# Patient Record
Sex: MALE | Race: BLACK OR AFRICAN AMERICAN | Employment: FULL TIME
[De-identification: names, ages, dates, MRNs, and addresses within clinical notes are randomized per-mention and may not be internally consistent; named-entity substitution may affect disease eponyms.]

---

## 2017-01-09 ENCOUNTER — OFFICE VISIT (OUTPATIENT)
Dept: FAMILY MEDICINE CLINIC | Facility: CLINIC | Age: 62
End: 2017-01-09

## 2017-01-09 VITALS
HEART RATE: 87 BPM | WEIGHT: 207 LBS | HEIGHT: 75 IN | DIASTOLIC BLOOD PRESSURE: 70 MMHG | BODY MASS INDEX: 25.74 KG/M2 | SYSTOLIC BLOOD PRESSURE: 90 MMHG

## 2017-01-09 DIAGNOSIS — M47.817 SPONDYLOSIS OF LUMBOSACRAL REGION WITHOUT MYELOPATHY OR RADICULOPATHY: ICD-10-CM

## 2017-01-09 DIAGNOSIS — N52.9 ERECTILE DYSFUNCTION, UNSPECIFIED ERECTILE DYSFUNCTION TYPE: ICD-10-CM

## 2017-01-09 DIAGNOSIS — E78.5 DYSLIPIDEMIA: ICD-10-CM

## 2017-01-09 DIAGNOSIS — M19.011 PRIMARY OSTEOARTHRITIS OF BOTH SHOULDERS: ICD-10-CM

## 2017-01-09 DIAGNOSIS — M19.012 PRIMARY OSTEOARTHRITIS OF BOTH SHOULDERS: ICD-10-CM

## 2017-01-09 DIAGNOSIS — M17.0 PRIMARY OSTEOARTHRITIS OF BOTH KNEES: ICD-10-CM

## 2017-01-09 DIAGNOSIS — M75.122 COMPLETE TEAR OF LEFT ROTATOR CUFF: ICD-10-CM

## 2017-01-09 DIAGNOSIS — Z12.5 SPECIAL SCREENING FOR MALIGNANT NEOPLASM OF PROSTATE: ICD-10-CM

## 2017-01-09 DIAGNOSIS — I89.0 LYMPHEDEMA OF LEFT LEG: ICD-10-CM

## 2017-01-09 DIAGNOSIS — S83.8X1A SPRAIN OF OTHER LIGAMENT OF RIGHT KNEE, INITIAL ENCOUNTER: Primary | ICD-10-CM

## 2017-01-09 PROCEDURE — 99213 OFFICE O/P EST LOW 20 MIN: CPT | Performed by: FAMILY MEDICINE

## 2017-01-09 RX ORDER — SILDENAFIL 100 MG/1
100 TABLET, FILM COATED ORAL PRN
Qty: 36 TABLET | Refills: 2 | Status: SHIPPED | OUTPATIENT
Start: 2017-01-09 | End: 2018-05-11 | Stop reason: SDUPTHER

## 2017-01-09 RX ORDER — IBUPROFEN 800 MG/1
800 TABLET ORAL EVERY 8 HOURS PRN
Qty: 270 TABLET | Refills: 3 | Status: SHIPPED | OUTPATIENT
Start: 2017-01-09 | End: 2018-06-14 | Stop reason: SDUPTHER

## 2017-01-09 ASSESSMENT — PATIENT HEALTH QUESTIONNAIRE - PHQ9
1. LITTLE INTEREST OR PLEASURE IN DOING THINGS: 0
2. FEELING DOWN, DEPRESSED OR HOPELESS: 0
SUM OF ALL RESPONSES TO PHQ9 QUESTIONS 1 & 2: 0
SUM OF ALL RESPONSES TO PHQ QUESTIONS 1-9: 0

## 2017-01-12 ENCOUNTER — OFFICE VISIT (OUTPATIENT)
Dept: ORTHOPEDIC SURGERY | Facility: CLINIC | Age: 62
End: 2017-01-12

## 2017-01-12 VITALS — BODY MASS INDEX: 25.49 KG/M2 | HEIGHT: 75 IN | WEIGHT: 205 LBS

## 2017-01-12 DIAGNOSIS — M25.361 PATELLAR INSTABILITY OF RIGHT KNEE: Primary | ICD-10-CM

## 2017-01-12 PROCEDURE — 99213 OFFICE O/P EST LOW 20 MIN: CPT | Performed by: ORTHOPAEDIC SURGERY

## 2017-01-14 ASSESSMENT — ENCOUNTER SYMPTOMS
NAUSEA: 0
TROUBLE SWALLOWING: 0
RHINORRHEA: 0
CONSTIPATION: 0
SHORTNESS OF BREATH: 0
BACK PAIN: 1
ABDOMINAL PAIN: 0
COUGH: 0
CHEST TIGHTNESS: 0
DIARRHEA: 0
SORE THROAT: 0

## 2017-01-17 ENCOUNTER — OFFICE VISIT (OUTPATIENT)
Dept: ORTHOPEDIC SURGERY | Facility: CLINIC | Age: 62
End: 2017-01-17

## 2017-01-17 VITALS — BODY MASS INDEX: 25.49 KG/M2 | WEIGHT: 205.03 LBS | HEIGHT: 75 IN

## 2017-01-17 DIAGNOSIS — M25.361 PATELLAR INSTABILITY OF RIGHT KNEE: Primary | ICD-10-CM

## 2017-01-17 DIAGNOSIS — M17.0 PRIMARY OSTEOARTHRITIS OF BOTH KNEES: ICD-10-CM

## 2017-01-17 PROCEDURE — 99212 OFFICE O/P EST SF 10 MIN: CPT | Performed by: ORTHOPAEDIC SURGERY

## 2017-02-21 RX ORDER — SILDENAFIL CITRATE 100 MG
TABLET ORAL
Qty: 36 TABLET | Refills: 0 | Status: SHIPPED | OUTPATIENT
Start: 2017-02-21 | End: 2017-05-23 | Stop reason: SDUPTHER

## 2017-03-10 ENCOUNTER — TELEPHONE (OUTPATIENT)
Dept: ORTHOPEDIC SURGERY | Facility: CLINIC | Age: 62
End: 2017-03-10

## 2017-03-13 DIAGNOSIS — M17.0 PRIMARY OSTEOARTHRITIS OF BOTH KNEES: Primary | ICD-10-CM

## 2017-05-23 RX ORDER — SILDENAFIL CITRATE 100 MG
TABLET ORAL
Qty: 36 TABLET | Refills: 3 | Status: SHIPPED | OUTPATIENT
Start: 2017-05-23

## 2018-05-11 ENCOUNTER — OFFICE VISIT (OUTPATIENT)
Dept: FAMILY MEDICINE CLINIC | Age: 63
End: 2018-05-11
Payer: COMMERCIAL

## 2018-05-11 VITALS
HEIGHT: 75 IN | BODY MASS INDEX: 27.21 KG/M2 | DIASTOLIC BLOOD PRESSURE: 89 MMHG | SYSTOLIC BLOOD PRESSURE: 125 MMHG | WEIGHT: 218.8 LBS | HEART RATE: 85 BPM

## 2018-05-11 DIAGNOSIS — S40.861A INSECT BITE OF ARM, RIGHT, INITIAL ENCOUNTER: Primary | ICD-10-CM
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
## 2018-05-11 DIAGNOSIS — Z23 NEED FOR 23-POLYVALENT PNEUMOCOCCAL POLYSACCHARIDE VACCINE: ICD-10-CM

## 2018-05-11 PROCEDURE — 90471 IMMUNIZATION ADMIN: CPT | Performed by: NURSE PRACTITIONER

## 2018-05-11 PROCEDURE — 99213 OFFICE O/P EST LOW 20 MIN: CPT | Performed by: NURSE PRACTITIONER

## 2018-05-11 PROCEDURE — 90732 PPSV23 VACC 2 YRS+ SUBQ/IM: CPT | Performed by: NURSE PRACTITIONER

## 2018-05-11 RX ORDER — CETIRIZINE HYDROCHLORIDE 10 MG/1
10 TABLET ORAL DAILY
Qty: 14 TABLET | Refills: 0 | Status: SHIPPED | OUTPATIENT
Start: 2018-05-11 | End: 2020-06-01 | Stop reason: ALTCHOICE

## 2018-05-11 ASSESSMENT — ENCOUNTER SYMPTOMS: COLOR CHANGE: 0

## 2018-05-11 ASSESSMENT — PATIENT HEALTH QUESTIONNAIRE - PHQ9
SUM OF ALL RESPONSES TO PHQ QUESTIONS 1-9: 0
1. LITTLE INTEREST OR PLEASURE IN DOING THINGS: 0
SUM OF ALL RESPONSES TO PHQ9 QUESTIONS 1 & 2: 0

## 2018-05-15 ENCOUNTER — TELEPHONE (OUTPATIENT)
Dept: FAMILY MEDICINE CLINIC | Age: 63
End: 2018-05-15

## 2018-06-14 ENCOUNTER — OFFICE VISIT (OUTPATIENT)
Dept: FAMILY MEDICINE CLINIC | Age: 63
End: 2018-06-14
Payer: COMMERCIAL

## 2018-06-14 VITALS
RESPIRATION RATE: 20 BRPM | SYSTOLIC BLOOD PRESSURE: 110 MMHG | HEIGHT: 75 IN | WEIGHT: 214 LBS | TEMPERATURE: 97.5 F | HEART RATE: 72 BPM | DIASTOLIC BLOOD PRESSURE: 76 MMHG | OXYGEN SATURATION: 97 % | BODY MASS INDEX: 26.61 KG/M2

## 2018-06-14 DIAGNOSIS — M19.012 PRIMARY OSTEOARTHRITIS OF BOTH SHOULDERS: ICD-10-CM

## 2018-06-14 DIAGNOSIS — M17.0 PRIMARY OSTEOARTHRITIS OF BOTH KNEES: ICD-10-CM

## 2018-06-14 DIAGNOSIS — M19.011 PRIMARY OSTEOARTHRITIS OF BOTH SHOULDERS: ICD-10-CM
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
## 2018-06-14 DIAGNOSIS — S40.861D: Primary | ICD-10-CM

## 2018-06-14 PROCEDURE — 99213 OFFICE O/P EST LOW 20 MIN: CPT | Performed by: PHYSICIAN ASSISTANT

## 2018-06-14 RX ORDER — IBUPROFEN 800 MG/1
800 TABLET ORAL EVERY 8 HOURS PRN
Qty: 270 TABLET | Refills: 1 | Status: SHIPPED | OUTPATIENT
Start: 2018-06-14 | End: 2020-09-26 | Stop reason: SDUPTHER

## 2018-06-14 ASSESSMENT — ENCOUNTER SYMPTOMS
RHINORRHEA: 0
DIARRHEA: 0
SHORTNESS OF BREATH: 0
VOMITING: 0

## 2018-06-14 ASSESSMENT — PATIENT HEALTH QUESTIONNAIRE - PHQ9
2. FEELING DOWN, DEPRESSED OR HOPELESS: 0
SUM OF ALL RESPONSES TO PHQ9 QUESTIONS 1 & 2: 0
SUM OF ALL RESPONSES TO PHQ QUESTIONS 1-9: 0
1. LITTLE INTEREST OR PLEASURE IN DOING THINGS: 0

## 2018-06-16 ASSESSMENT — ENCOUNTER SYMPTOMS
COUGH: 0
NAUSEA: 0
CONSTIPATION: 0
BACK PAIN: 0

## 2018-07-10 ENCOUNTER — OFFICE VISIT (OUTPATIENT)
Dept: FAMILY MEDICINE CLINIC | Age: 63
End: 2018-07-10
Payer: COMMERCIAL

## 2018-07-10 VITALS
RESPIRATION RATE: 20 BRPM | HEIGHT: 75 IN | HEART RATE: 64 BPM | TEMPERATURE: 97 F | SYSTOLIC BLOOD PRESSURE: 116 MMHG | OXYGEN SATURATION: 99 % | WEIGHT: 211.6 LBS | DIASTOLIC BLOOD PRESSURE: 72 MMHG | BODY MASS INDEX: 26.31 KG/M2

## 2018-07-10 DIAGNOSIS — M19.011 PRIMARY OSTEOARTHRITIS OF BOTH SHOULDERS: Primary | ICD-10-CM

## 2018-07-10 DIAGNOSIS — E78.5 DYSLIPIDEMIA: ICD-10-CM

## 2018-07-10 DIAGNOSIS — Z12.5 SPECIAL SCREENING FOR MALIGNANT NEOPLASM OF PROSTATE: ICD-10-CM

## 2018-07-10 DIAGNOSIS — M19.012 PRIMARY OSTEOARTHRITIS OF BOTH SHOULDERS: Primary | ICD-10-CM

## 2018-07-10 PROCEDURE — 99213 OFFICE O/P EST LOW 20 MIN: CPT | Performed by: PHYSICIAN ASSISTANT

## 2018-07-10 NOTE — PROGRESS NOTES
Visit Information    Have you changed or started any medications since your last visit including any over-the-counter medicines, vitamins, or herbal medicines? no   Have you stopped taking any of your medications? Is so, why? -  no  Are you having any side effects from any of your medications? - no    Have you seen any other physician or provider since your last visit?  no   Have you had any other diagnostic tests since your last visit?  no   Have you been seen in the emergency room and/or had an admission in a hospital since we last saw you?  no   Have you had your routine dental cleaning in the past 6 months?  no     Do you have an active MyChart account? If no, what is the barrier?   Yes    Patient Care Team:  Michael Neri MD as PCP - General (Family Medicine)  Elliot Dhillon PA-C as PCP - S Attributed Provider    Medical History Review  Past Medical, Family, and Social History reviewed and does not contribute to the patient presenting condition    Health Maintenance   Topic Date Due    Hepatitis C screen  12/01/2018 (Originally 1955)    HIV screen  12/01/2018 (Originally 5/14/1970)    DTaP/Tdap/Td vaccine (1 - Tdap) 05/11/2019 (Originally 5/14/1974)    Shingles Vaccine (1 of 2 - 2 Dose Series) 05/11/2019 (Originally 5/14/2005)    Flu vaccine (1) 09/01/2018    Colon cancer screen colonoscopy  06/01/2020    Lipid screen  01/16/2022    Pneumococcal med risk  Completed

## 2018-07-10 NOTE — PROGRESS NOTES
[address]  [facility] [address]  Dept: [phone]  Dept Fax: [phone]    Office Progress/Follow Up Note  Date of patient's visit: 7/10/2018  Patient's Name:  Eryn Brar YOB: 1955            Patient Care Team:  Alycia Zhao MD as PCP - General (Family Medicine)  Miranda Kolb PA-C as PCP - S Attributed Provider  ================================================================    REASON FOR VISIT/CHIEF COMPLAINT:  Arm Pain (Rt arm discomfort possibly related to a recent insect bite. pt states it feels like something is crawling under his skin )    HISTORY OF PRESENTING ILLNESS:  History was obtained from: patient. Eryn Brar is a 61 y.o. is here planing of decreased range of motion in right shoulder. Patient states steroid cream prescribed has improved skin disorder. Discuss with patient origin of right shoulder pain and decreased range of motion. Patient was provided stretches and referral to the PT. Patient is followed by Ortho, instructed patient to call and schedule appointment to have right shoulder evaluated. Instructed patient to inform Ortho of PT referral and get okay to go to PT before Ortho appointment, patient voices understanding. Health maintenance review, lab work order, instructed patient to have lab work completed before follow-up appointment, patient voices understanding. Shoulder Pain     The pain is present in the right shoulder. This is a new problem. The current episode started 1 to 4 weeks ago. The pain is at a severity of 0/10. The patient is experiencing no pain. Associated symptoms include joint locking and a limited range of motion. Pertinent negatives include no fever, joint swelling, numbness, stiffness or tingling. He has tried NSAIDS and heat for the symptoms. The treatment provided moderate relief. His past medical history is significant for osteoarthritis.        Patient Active Problem List   Diagnosis    Dyslipidemia    Hammer toe    Unstable gait    Tobacco abuse    Lumbar back sprain    Osteoarthritis of lumbosacral spine    Primary osteoarthritis of both knees    Primary osteoarthritis of both shoulders    Special screening for malignant neoplasm of prostate    Erectile dysfunction    S/P rotator cuff repair       There are no preventive care reminders to display for this patient. Allergies   Allergen Reactions    Percocet [Oxycodone-Acetaminophen] Other (See Comments)     Insomnia and anxiety    Shrimp Flavor Nausea And Vomiting     all sea food causes Nausea & vomiting           Current Outpatient Prescriptions   Medication Sig Dispense Refill    ibuprofen (ADVIL;MOTRIN) 800 MG tablet Take 1 tablet by mouth every 8 hours as needed for Pain 270 tablet 1    hydrocortisone 2.5 % cream Apply topically 2 times daily. 20 g 1    cetirizine (ZYRTEC ALLERGY) 10 MG tablet Take 1 tablet by mouth daily 14 tablet 0    VIAGRA 100 MG tablet TAKE 1 TABLET AS NEEDED 36 tablet 3    Handicap Placard MISC by Does not apply route Please provide with handicap placard for 5 years 1 each 0     No current facility-administered medications for this visit. Social History   Substance Use Topics    Smoking status: Current Every Day Smoker     Packs/day: 0.10     Years: 25.00     Types: Cigarettes     Start date: 12/28/1965    Smokeless tobacco: Never Used      Comment: 1-2 cigarettes a day     Alcohol use 0.0 oz/week       Family History   Problem Relation Age of Onset    Alzheimer's Disease Mother         REVIEW OF SYSTEMS:  Review of Systems   Constitutional: Negative for chills and fever. Respiratory: Negative for cough and shortness of breath. Cardiovascular: Negative for chest pain. Gastrointestinal: Negative for constipation, diarrhea, nausea and vomiting. Genitourinary: Negative for dysuria and frequency. Musculoskeletal: Negative for stiffness.         See HPI   Neurological: Negative for tingling, numbness and headaches. PHYSICAL EXAM:  Vitals:    07/10/18 0948   BP: 116/72   Site: Right Arm   Position: Sitting   Cuff Size: Medium Adult   Pulse: 64   Resp: 20   Temp: 97 °F (36.1 °C)   TempSrc: Oral   SpO2: 99%   Weight: 211 lb 9.6 oz (96 kg)   Height: 6' 3.2\" (1.91 m)     BP Readings from Last 3 Encounters:   07/10/18 116/72   06/14/18 110/76   05/11/18 125/89        Physical Exam   Constitutional: He is oriented to person, place, and time and well-developed, well-nourished, and in no distress. HENT:   Head: Normocephalic and atraumatic. Eyes: Pupils are equal, round, and reactive to light. Cardiovascular: Normal rate, regular rhythm and normal heart sounds. Pulmonary/Chest: Effort normal and breath sounds normal.   Abdominal: Soft. He exhibits no mass. There is no tenderness. Musculoskeletal: He exhibits no edema. Right shoulder: He exhibits decreased range of motion and crepitus. He exhibits normal strength. Neurological: He is alert and oriented to person, place, and time. Gait normal.   Skin: Skin is warm and dry. Vitals reviewed. DIAGNOSTIC FINDINGS:  CBC:  Lab Results   Component Value Date    WBC 6.1 01/16/2017    HGB 12.7 01/16/2017     01/16/2017       BMP:    Lab Results   Component Value Date     01/16/2017    K 4.2 01/16/2017     01/16/2017    CO2 21 01/16/2017    BUN 24 01/16/2017    CREATININE 0.95 01/16/2017    GLUCOSE 98 01/16/2017       HEMOGLOBIN A1C: No results found for: LABA1C    FASTING LIPID PANEL:  Lab Results   Component Value Date    CHOL 170 01/16/2017    HDL 68 01/16/2017    TRIG 82 01/16/2017       ASSESSMENT AND PLAN:  Yvette Mccormick was seen today for arm pain.     Diagnoses and all orders for this visit:    Primary osteoarthritis of both shoulders  -     External Referral To Physical Therapy    Special screening for malignant neoplasm of prostate  -     Psa screening; Future    Dyslipidemia  -     Comprehensive Metabolic Panel; Future  -     CBC Auto Differential; Future  -     Lipid Panel; Future      FOLLOW UP AND INSTRUCTIONS:  Return in about 5 months (around 12/10/2018) for Shoulder Pain, Lab Review. · Discussed use, benefit, and side effects of prescribed medications. Barriers to medication compliance addressed. All patient questions answered. Pt voiced understanding. · Patient given educational materials - see patient instructions    Electronically signed by Sudhir Redman PA-C on 7/10/18 at 10:03 AM    This note is created with the assistance of a speech-recognition program. While intending to generate a document that actually reflects the content of the visit, the document can still have some mistakes which may not have been identified and corrected by editing.

## 2018-07-10 NOTE — PATIENT INSTRUCTIONS
degrees at the most (shoulder level is 90 degrees). 2. Hold the position for 3 to 5 seconds. Then lower your arm back to your side. If you need to, bring your \"good\" arm across your body and place it under the elbow as you lower your injured arm. Use your good arm to keep your injured arm from dropping down too fast.  3. Repeat 8 to 12 times. 4. When you first start out, don't hold any extra weight in your hand. As you get stronger, you may use a 1-pound to 2-pound dumbbell or a small can of food. Shoulder flexor and extensor exercise    These are isometric exercises. That means you contract your muscles without actually moving. 1. Push forward (flex): Stand facing a wall or doorjamb, about 6 inches or less back. Hold your injured arm against your body. Make a closed fist with your thumb on top. Then gently push your hand forward into the wall with about 25% to 50% of your strength. Don't let your body move backward as you push. Hold for about 6 seconds. Relax for a few seconds. Repeat 8 to 12 times. 2. Push backward (extend): Stand with your back flat against a wall. Your upper arm should be against the wall, with your elbow bent 90 degrees (your hand straight ahead). Push your elbow gently back against the wall with about 25% to 50% of your strength. Don't let your body move forward as you push. Hold for about 6 seconds. Relax for a few seconds. Repeat 8 to 12 times. Scapular exercise: Wall push-ups    This exercise is best done with your fingers somewhat turned out, rather than straight up and down. 1. Stand facing a wall, about 12 inches to 18 inches away. 2. Place your hands on the wall at shoulder height. 3. Slowly bend your elbows and bring your face to the wall. Keep your back and hips straight. 4. Push back to where you started. 5. Repeat 8 to 12 times. 6. When you can do this exercise against a wall comfortably, you can try it against a counter.  You can then slowly progress to the end of a couch, then to a sturdy chair, and finally to the floor. Scapular exercise: Retraction    For this exercise, you will need elastic exercise material, such as surgical tubing or Thera-Band. 1. Put the band around a solid object at about waist level. (A bedpost will work well.) Each hand should hold an end of the band. 2. With your elbows at your sides and bent to 90 degrees, pull the band back. Your shoulder blades should move toward each other. Then move your arms back where you started. 3. Repeat 8 to 12 times. 4. If you have good range of motion in your shoulders, try this exercise with your arms lifted out to the sides. Keep your elbows at a 90-degree angle. Raise the elastic band up to about shoulder level. Pull the band back to move your shoulder blades toward each other. Then move your arms back where you started. Internal rotator strengthening exercise    1. Start by tying a piece of elastic exercise material to a doorknob. You can use surgical tubing or Thera-Band. 2. Stand or sit with your shoulder relaxed and your elbow bent 90 degrees. Your upper arm should rest comfortably against your side. Squeeze a rolled towel between your elbow and your body for comfort. This will help keep your arm at your side. 3. Hold one end of the elastic band in the hand of the painful arm. 4. Slowly rotate your forearm toward your body until it touches your belly. Slowly move it back to where you started. 5. Keep your elbow and upper arm firmly tucked against the towel roll or at your side. 6. Repeat 8 to 12 times. External rotator strengthening exercise    1. Start by tying a piece of elastic exercise material to a doorknob. You can use surgical tubing or Thera-Band. (You may also hold one end of the band in each hand.)  2. Stand or sit with your shoulder relaxed and your elbow bent 90 degrees. Your upper arm should rest comfortably against your side.  Squeeze a rolled towel between your elbow and your body for

## 2018-07-11 ASSESSMENT — ENCOUNTER SYMPTOMS
CONSTIPATION: 0
NAUSEA: 0
SHORTNESS OF BREATH: 0
COUGH: 0
VOMITING: 0
DIARRHEA: 0

## 2018-07-19 ENCOUNTER — OFFICE VISIT (OUTPATIENT)
Dept: ORTHOPEDIC SURGERY | Age: 63
End: 2018-07-19
Payer: COMMERCIAL

## 2018-07-19 VITALS — BODY MASS INDEX: 26.98 KG/M2 | HEIGHT: 75 IN | WEIGHT: 217 LBS

## 2018-07-19 DIAGNOSIS — M25.511 CHRONIC RIGHT SHOULDER PAIN: ICD-10-CM

## 2018-07-19 DIAGNOSIS — G89.29 CHRONIC RIGHT SHOULDER PAIN: ICD-10-CM

## 2018-07-19 DIAGNOSIS — M47.22 OSTEOARTHRITIS OF SPINE WITH RADICULOPATHY, CERVICAL REGION: ICD-10-CM

## 2018-07-19 DIAGNOSIS — G56.02 CARPAL TUNNEL SYNDROME OF LEFT WRIST: ICD-10-CM

## 2018-07-19 DIAGNOSIS — M75.101 TEAR OF RIGHT ROTATOR CUFF, UNSPECIFIED TEAR EXTENT: Primary | ICD-10-CM

## 2018-07-19 PROCEDURE — 99213 OFFICE O/P EST LOW 20 MIN: CPT | Performed by: ORTHOPAEDIC SURGERY

## 2018-07-19 PROCEDURE — 20526 THER INJECTION CARP TUNNEL: CPT | Performed by: ORTHOPAEDIC SURGERY

## 2018-07-19 RX ORDER — METHYLPREDNISOLONE ACETATE 40 MG/ML
40 INJECTION, SUSPENSION INTRA-ARTICULAR; INTRALESIONAL; INTRAMUSCULAR; SOFT TISSUE ONCE
Status: COMPLETED | OUTPATIENT
Start: 2018-07-19 | End: 2018-07-19

## 2018-07-19 RX ADMIN — METHYLPREDNISOLONE ACETATE 40 MG: 40 INJECTION, SUSPENSION INTRA-ARTICULAR; INTRALESIONAL; INTRAMUSCULAR; SOFT TISSUE at 09:01

## 2018-07-19 NOTE — PROGRESS NOTES
demonstrate full range of motion. He had no pain. Examination of the hand show minor thenar wasting bilaterally. Sensation appeared to be normal but [place] test bilaterally is positive. X-rays: I reviewed the x-rays of the shoulder which show that the patient has the unusual coracoclavicular joint. He also has some degenerative changes in the acromioclavicular joint and the humeral head appears to be riding thigh suggestive of a chronic rotator cuff arthropathy. Diagnosis: #1 chronic rotator cuff arthropathy right shoulder. #2 cervical spondylosis. #3 bilateral carpal tunnel syndrome left being worse than the right. Treatment: As for the carpal tunnel after thoroughly cleaning the skin with Betadine and alcohol I injected the left carpal tunnel with 40 mg of Depo-Medrol without any complications. The patient is going to have x-rays of the shoulder and cervical spine and an MRI of the right shoulder. I will see him again after those studies.

## 2018-08-02 ENCOUNTER — HOSPITAL ENCOUNTER (OUTPATIENT)
Dept: MRI IMAGING | Facility: CLINIC | Age: 63
Discharge: HOME | End: 2018-08-04
Payer: COMMERCIAL

## 2018-08-02 DIAGNOSIS — M25.511 CHRONIC RIGHT SHOULDER PAIN: ICD-10-CM

## 2018-08-02 DIAGNOSIS — G89.29 CHRONIC RIGHT SHOULDER PAIN: ICD-10-CM

## 2018-08-02 DIAGNOSIS — M75.101 TEAR OF RIGHT ROTATOR CUFF, UNSPECIFIED TEAR EXTENT: ICD-10-CM

## 2018-08-02 PROCEDURE — 73221 MRI JOINT UPR EXTREM W/O DYE: CPT

## 2018-08-07 ENCOUNTER — HOSPITAL ENCOUNTER (OUTPATIENT)
Dept: GENERAL RADIOLOGY | Age: 63
Discharge: HOME | End: 2018-08-09
Payer: COMMERCIAL

## 2018-08-07 ENCOUNTER — HOSPITAL ENCOUNTER (OUTPATIENT)
Age: 63
Discharge: HOME | End: 2018-08-07
Payer: COMMERCIAL

## 2018-08-07 ENCOUNTER — HOSPITAL ENCOUNTER (OUTPATIENT)
Age: 63
Discharge: HOME | End: 2018-08-09
Payer: COMMERCIAL

## 2018-08-07 DIAGNOSIS — Z12.5 SPECIAL SCREENING FOR MALIGNANT NEOPLASM OF PROSTATE: ICD-10-CM

## 2018-08-07 DIAGNOSIS — E78.5 DYSLIPIDEMIA: ICD-10-CM

## 2018-08-07 DIAGNOSIS — M75.101 TEAR OF RIGHT ROTATOR CUFF, UNSPECIFIED TEAR EXTENT: ICD-10-CM

## 2018-08-07 DIAGNOSIS — M47.22 OSTEOARTHRITIS OF SPINE WITH RADICULOPATHY, CERVICAL REGION: Primary | ICD-10-CM

## 2018-08-07 DIAGNOSIS — M47.22 OSTEOARTHRITIS OF SPINE WITH RADICULOPATHY, CERVICAL REGION: ICD-10-CM

## 2018-08-07 DIAGNOSIS — G89.29 CHRONIC RIGHT SHOULDER PAIN: ICD-10-CM

## 2018-08-07 DIAGNOSIS — M25.511 CHRONIC RIGHT SHOULDER PAIN: ICD-10-CM

## 2018-08-07 LAB
ABSOLUTE EOS #: 0.2 K/UL (ref 0–0.4)
ABSOLUTE IMMATURE GRANULOCYTE: ABNORMAL K/UL (ref 0–0.3)
ABSOLUTE LYMPH #: 1.8 K/UL (ref 1–4.8)
ABSOLUTE MONO #: 0.4 K/UL (ref 0.2–0.8)
ALBUMIN SERPL-MCNC: 4.4 G/DL (ref 3.5–5.2)
ALBUMIN/GLOBULIN RATIO: ABNORMAL (ref 1–2.5)
ALP BLD-CCNC: 66 U/L (ref 40–129)
ALT SERPL-CCNC: 26 U/L (ref 5–41)
ANION GAP SERPL CALCULATED.3IONS-SCNC: 10 MMOL/L (ref 9–17)
AST SERPL-CCNC: 25 U/L
BASOPHILS # BLD: 1 % (ref 0–2)
BASOPHILS ABSOLUTE: 0 K/UL (ref 0–0.2)
BILIRUB SERPL-MCNC: 0.38 MG/DL (ref 0.3–1.2)
BUN BLDV-MCNC: 17 MG/DL (ref 8–23)
BUN/CREAT BLD: 17 (ref 9–20)
CALCIUM SERPL-MCNC: 9 MG/DL (ref 8.6–10.4)
CHLORIDE BLD-SCNC: 108 MMOL/L (ref 98–107)
CHOLESTEROL/HDL RATIO: 2.9
CHOLESTEROL: 190 MG/DL
CO2: 24 MMOL/L (ref 20–31)
CREAT SERPL-MCNC: 1.03 MG/DL (ref 0.7–1.2)
DIFFERENTIAL TYPE: ABNORMAL
EOSINOPHILS RELATIVE PERCENT: 5 % (ref 1–4)
GFR AFRICAN AMERICAN: >60 ML/MIN
GFR NON-AFRICAN AMERICAN: >60 ML/MIN
GFR SERPL CREATININE-BSD FRML MDRD: ABNORMAL ML/MIN/{1.73_M2}
GFR SERPL CREATININE-BSD FRML MDRD: ABNORMAL ML/MIN/{1.73_M2}
GLUCOSE BLD-MCNC: 91 MG/DL (ref 70–99)
HCT VFR BLD CALC: 40.1 % (ref 41–53)
HDLC SERPL-MCNC: 65 MG/DL
HEMOGLOBIN: 13.3 G/DL (ref 13.5–17.5)
IMMATURE GRANULOCYTES: ABNORMAL %
LDL CHOLESTEROL: 106 MG/DL (ref 0–130)
LYMPHOCYTES # BLD: 39 % (ref 24–44)
MCH RBC QN AUTO: 32.6 PG (ref 26–34)
MCHC RBC AUTO-ENTMCNC: 33.1 G/DL (ref 31–37)
MCV RBC AUTO: 98.6 FL (ref 80–100)
MONOCYTES # BLD: 9 % (ref 1–7)
NRBC AUTOMATED: ABNORMAL PER 100 WBC
PDW BLD-RTO: 14.8 % (ref 11.5–14.5)
PLATELET # BLD: 257 K/UL (ref 130–400)
PLATELET ESTIMATE: ABNORMAL
PMV BLD AUTO: 7.6 FL (ref 6–12)
POTASSIUM SERPL-SCNC: 4.3 MMOL/L (ref 3.7–5.3)
PROSTATE SPECIFIC ANTIGEN: 1.19 UG/L
RBC # BLD: 4.06 M/UL (ref 4.5–5.9)
RBC # BLD: ABNORMAL 10*6/UL
SEG NEUTROPHILS: 46 % (ref 36–66)
SEGMENTED NEUTROPHILS ABSOLUTE COUNT: 2.2 K/UL (ref 1.8–7.7)
SODIUM BLD-SCNC: 142 MMOL/L (ref 135–144)
TOTAL PROTEIN: 6.8 G/DL (ref 6.4–8.3)
TRIGL SERPL-MCNC: 95 MG/DL
VLDLC SERPL CALC-MCNC: NORMAL MG/DL (ref 1–30)
WBC # BLD: 4.6 K/UL (ref 3.5–11)
WBC # BLD: ABNORMAL 10*3/UL

## 2018-08-07 PROCEDURE — 80061 LIPID PANEL: CPT

## 2018-08-07 PROCEDURE — 85025 COMPLETE CBC W/AUTO DIFF WBC: CPT

## 2018-08-07 PROCEDURE — G0103 PSA SCREENING: HCPCS

## 2018-08-07 PROCEDURE — 72040 X-RAY EXAM NECK SPINE 2-3 VW: CPT

## 2018-08-07 PROCEDURE — 36415 COLL VENOUS BLD VENIPUNCTURE: CPT

## 2018-08-07 PROCEDURE — 80053 COMPREHEN METABOLIC PANEL: CPT

## 2018-08-07 PROCEDURE — 73030 X-RAY EXAM OF SHOULDER: CPT

## 2018-08-16 ENCOUNTER — OFFICE VISIT (OUTPATIENT)
Dept: FAMILY MEDICINE CLINIC | Age: 63
End: 2018-08-16
Payer: COMMERCIAL

## 2018-08-16 ENCOUNTER — OFFICE VISIT (OUTPATIENT)
Dept: ORTHOPEDIC SURGERY | Age: 63
End: 2018-08-16
Payer: COMMERCIAL

## 2018-08-16 VITALS
WEIGHT: 206.4 LBS | SYSTOLIC BLOOD PRESSURE: 117 MMHG | RESPIRATION RATE: 16 BRPM | DIASTOLIC BLOOD PRESSURE: 76 MMHG | HEIGHT: 75 IN | OXYGEN SATURATION: 96 % | HEART RATE: 74 BPM | BODY MASS INDEX: 25.66 KG/M2

## 2018-08-16 VITALS — HEIGHT: 75 IN | BODY MASS INDEX: 26.98 KG/M2 | WEIGHT: 217 LBS

## 2018-08-16 DIAGNOSIS — Z71.6 TOBACCO ABUSE COUNSELING: ICD-10-CM

## 2018-08-16 DIAGNOSIS — M47.812 SPONDYLOSIS OF CERVICAL REGION WITHOUT MYELOPATHY OR RADICULOPATHY: Primary | ICD-10-CM

## 2018-08-16 DIAGNOSIS — Z00.00 ANNUAL PHYSICAL EXAM: Primary | ICD-10-CM

## 2018-08-16 DIAGNOSIS — M19.012 PRIMARY OSTEOARTHRITIS OF BOTH SHOULDERS: ICD-10-CM

## 2018-08-16 DIAGNOSIS — M19.011 PRIMARY OSTEOARTHRITIS OF BOTH SHOULDERS: ICD-10-CM

## 2018-08-16 DIAGNOSIS — M75.111 INCOMPLETE TEAR OF RIGHT ROTATOR CUFF: ICD-10-CM

## 2018-08-16 PROCEDURE — 99396 PREV VISIT EST AGE 40-64: CPT | Performed by: PHYSICIAN ASSISTANT

## 2018-08-16 PROCEDURE — 99213 OFFICE O/P EST LOW 20 MIN: CPT | Performed by: ORTHOPAEDIC SURGERY

## 2018-08-16 NOTE — PROGRESS NOTES
This patient is still having some pain in the shoulder. However she thinks that it is going to get better because he is going to go back to his original job. Graft the patient's pain complaint of pain today was in the back of the neck and the suprascapular area on the right side. Examination of the neck shows limitation of motion weeks pain on rotation. He has no radicular symptoms or signed. His shoulder examination shows today excellent painless motion. X-rays: He had an MRI which shows a minor tear in the rotator cuff. X-rays of the cervical spine show significant degenerative changes with the flattening of the lordosis. Juanita Hollow His x-rays of the shoulder taken in 2013 and the one taken this time and they show no change in the position of the humeral head and the glenoid fossa. The patient's x-ray of the just either in the past and the shoulder x-ray done today show that the patient does have coracoclavicular joint. 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 Diagnosis: Cervical spondylosis. #2 minor rotator cuff tear right shoulder. #3 acromioclavicular joint arthritis. #4.  Coracoclavicular joints. Treatment: Present time his symptoms are related only to the neck and I therefore advised him to just continue to mobilize the neck and we will see him again as necessary. He does not need any surgery for his rotator cuff tear because he is functioning well.

## 2018-08-16 NOTE — PROGRESS NOTES
(ADVIL;MOTRIN) 800 MG tablet Take 1 tablet by mouth every 8 hours as needed for Pain 270 tablet 1    Handicap Placard MISC by Does not apply route Please provide with handicap placard for 5 years 1 each 0    hydrocortisone 2.5 % cream Apply topically 2 times daily. 20 g 1    cetirizine (ZYRTEC ALLERGY) 10 MG tablet Take 1 tablet by mouth daily 14 tablet 0    VIAGRA 100 MG tablet TAKE 1 TABLET AS NEEDED 36 tablet 3     No current facility-administered medications for this visit. Social History   Substance Use Topics    Smoking status: Current Every Day Smoker     Packs/day: 0.10     Years: 25.00     Types: Cigarettes     Start date: 12/28/1965    Smokeless tobacco: Never Used      Comment: 1-2 cigarettes a day     Alcohol use 0.0 oz/week       Family History   Problem Relation Age of Onset    Alzheimer's Disease Mother         REVIEW OF SYSTEMS:  Review of Systems   Constitutional: Negative for chills and fever. HENT: Negative for congestion, hearing loss and sore throat. Eyes: Negative for blurred vision and double vision. Respiratory: Negative for cough, sputum production and shortness of breath. Cardiovascular: Negative for chest pain, palpitations and leg swelling. Gastrointestinal: Negative for abdominal pain, constipation, diarrhea, nausea and vomiting. Genitourinary: Negative for dysuria, frequency and urgency. Musculoskeletal: Positive for joint pain and myalgias. Negative for back pain and neck pain. Skin: Positive for itching and rash. Neurological: Negative for dizziness, sensory change and headaches.        PHYSICAL EXAM:  Vitals:    08/16/18 1354   BP: 117/76   Site: Left Arm   Position: Sitting   Cuff Size: Medium Adult   Pulse: 74   Resp: 16   SpO2: 96%   Weight: 206 lb 6.4 oz (93.6 kg)   Height: 6' 3\" (1.905 m)     BP Readings from Last 3 Encounters:   08/16/18 117/76   07/10/18 116/72   06/14/18 110/76        Physical Exam   Constitutional: He is oriented to person, Justina Lomax PA-C on 8/16/18 at 2:00 PM    This note is created with the assistance of a speech-recognition program. While intending to generate a document that actually reflects the content of the visit, the document can still have some mistakes which may not have been identified and corrected by editing.

## 2018-08-18 ASSESSMENT — ENCOUNTER SYMPTOMS
VOMITING: 0
BLURRED VISION: 0
CONSTIPATION: 0
DIARRHEA: 0
DOUBLE VISION: 0
BACK PAIN: 0
SHORTNESS OF BREATH: 0
SORE THROAT: 0
SPUTUM PRODUCTION: 0
COUGH: 0
ABDOMINAL PAIN: 0
NAUSEA: 0

## 2018-08-24 ENCOUNTER — TELEPHONE (OUTPATIENT)
Dept: ORTHOPEDIC SURGERY | Age: 63
End: 2018-08-24

## 2018-08-24 DIAGNOSIS — M47.812 SPONDYLOSIS OF CERVICAL REGION WITHOUT MYELOPATHY OR RADICULOPATHY: Primary | ICD-10-CM

## 2018-09-06 ENCOUNTER — TELEPHONE (OUTPATIENT)
Dept: ORTHOPEDIC SURGERY | Age: 63
End: 2018-09-06

## 2019-01-03 ENCOUNTER — OFFICE VISIT (OUTPATIENT)
Dept: ORTHOPEDIC SURGERY | Age: 64
End: 2019-01-03
Payer: COMMERCIAL

## 2019-01-03 ENCOUNTER — HOSPITAL ENCOUNTER (OUTPATIENT)
Age: 64
Discharge: HOME | End: 2019-01-05
Payer: COMMERCIAL

## 2019-01-03 ENCOUNTER — HOSPITAL ENCOUNTER (OUTPATIENT)
Dept: GENERAL RADIOLOGY | Age: 64
Discharge: HOME | End: 2019-01-05
Payer: COMMERCIAL

## 2019-01-03 VITALS — BODY MASS INDEX: 25.66 KG/M2 | WEIGHT: 206.35 LBS | HEIGHT: 75 IN

## 2019-01-03 DIAGNOSIS — M25.532 CHRONIC PAIN OF LEFT WRIST: Primary | ICD-10-CM

## 2019-01-03 DIAGNOSIS — M25.532 CHRONIC PAIN OF LEFT WRIST: ICD-10-CM

## 2019-01-03 DIAGNOSIS — G89.29 CHRONIC PAIN OF LEFT WRIST: Primary | ICD-10-CM

## 2019-01-03 DIAGNOSIS — G89.29 CHRONIC PAIN OF LEFT WRIST: ICD-10-CM

## 2019-01-03 DIAGNOSIS — M19.132 POST-TRAUMATIC OSTEOARTHRITIS OF LEFT WRIST: ICD-10-CM

## 2019-01-03 PROCEDURE — 99213 OFFICE O/P EST LOW 20 MIN: CPT | Performed by: ORTHOPAEDIC SURGERY

## 2019-01-03 PROCEDURE — 73110 X-RAY EXAM OF WRIST: CPT

## 2019-01-03 PROCEDURE — 20605 DRAIN/INJ JOINT/BURSA W/O US: CPT | Performed by: ORTHOPAEDIC SURGERY

## 2019-01-03 RX ORDER — METHYLPREDNISOLONE ACETATE 40 MG/ML
40 INJECTION, SUSPENSION INTRA-ARTICULAR; INTRALESIONAL; INTRAMUSCULAR; SOFT TISSUE ONCE
Status: COMPLETED | OUTPATIENT
Start: 2019-01-03 | End: 2019-01-03

## 2019-01-03 RX ADMIN — METHYLPREDNISOLONE ACETATE 40 MG: 40 INJECTION, SUSPENSION INTRA-ARTICULAR; INTRALESIONAL; INTRAMUSCULAR; SOFT TISSUE at 15:19

## 2019-02-07 ENCOUNTER — OFFICE VISIT (OUTPATIENT)
Dept: ORTHOPEDIC SURGERY | Age: 64
End: 2019-02-07
Payer: COMMERCIAL

## 2019-02-07 VITALS — HEIGHT: 75 IN | WEIGHT: 220 LBS | BODY MASS INDEX: 27.35 KG/M2

## 2019-02-07 DIAGNOSIS — M19.032 PRIMARY OSTEOARTHRITIS OF LEFT WRIST: Primary | ICD-10-CM

## 2019-02-07 PROCEDURE — 99213 OFFICE O/P EST LOW 20 MIN: CPT | Performed by: ORTHOPAEDIC SURGERY

## 2019-03-26 ENCOUNTER — TELEPHONE (OUTPATIENT)
Dept: INTERNAL MEDICINE CLINIC | Age: 64
End: 2019-03-26

## 2019-04-16 ENCOUNTER — OFFICE VISIT (OUTPATIENT)
Dept: INTERNAL MEDICINE CLINIC | Age: 64
End: 2019-04-16
Payer: COMMERCIAL

## 2019-04-16 VITALS
DIASTOLIC BLOOD PRESSURE: 80 MMHG | SYSTOLIC BLOOD PRESSURE: 128 MMHG | HEART RATE: 72 BPM | HEIGHT: 75 IN | TEMPERATURE: 98.6 F | WEIGHT: 208.2 LBS | BODY MASS INDEX: 25.89 KG/M2 | OXYGEN SATURATION: 97 %

## 2019-04-16 DIAGNOSIS — A08.4 STOMACH FLU: ICD-10-CM

## 2019-04-16 DIAGNOSIS — N52.9 ERECTILE DYSFUNCTION, UNSPECIFIED ERECTILE DYSFUNCTION TYPE: Primary | ICD-10-CM

## 2019-04-16 DIAGNOSIS — M47.817 OSTEOARTHRITIS OF LUMBOSACRAL SPINE: ICD-10-CM

## 2019-04-16 PROCEDURE — 99203 OFFICE O/P NEW LOW 30 MIN: CPT | Performed by: INTERNAL MEDICINE

## 2019-04-16 ASSESSMENT — PATIENT HEALTH QUESTIONNAIRE - PHQ9
SUM OF ALL RESPONSES TO PHQ9 QUESTIONS 1 & 2: 0
SUM OF ALL RESPONSES TO PHQ QUESTIONS 1-9: 0
1. LITTLE INTEREST OR PLEASURE IN DOING THINGS: 0
2. FEELING DOWN, DEPRESSED OR HOPELESS: 0
SUM OF ALL RESPONSES TO PHQ QUESTIONS 1-9: 0

## 2019-04-16 NOTE — PROGRESS NOTES
Visit Information    Have you changed or started any medications since your last visit including any over-the-counter medicines, vitamins, or herbal medicines? no   Are you having any side effects from any of your medications? -  no  Have you stopped taking any of your medications? Is so, why? -  no    Have you seen any other physician or provider since your last visit? No  Have you had any other diagnostic tests since your last visit? No  Have you been seen in the emergency room and/or had an admission to a hospital since we last saw you? No  Have you had your routine dental cleaning in the past 6 months? yes -     Have you activated your [employer] account? If not, what are your barriers?  Yes     Patient Care Team:  Yehuda Arrington MD as PCP - General (Internal Medicine)  Alice Long PA-C as PCP - [facility] Attributed Provider    Medical History Review  Past Medical, Family, and Social History reviewed and does not contribute to the patient presenting condition    Health Maintenance   Topic Date Due    Hepatitis C screen  1955    HIV screen  05/14/1970    DTaP/Tdap/Td vaccine (1 - Tdap) 05/11/2019 (Originally 5/14/1974)    Shingles Vaccine (1 of 2) 05/11/2019 (Originally 5/14/2005)    Flu vaccine (Season Ended) 09/01/2019    Colon cancer screen colonoscopy  06/01/2020    Lipid screen  08/07/2023    Pneumococcal 0-64 years Vaccine  Completed

## 2019-04-16 NOTE — PROGRESS NOTES
Isaías Dudley is a 61 y.o. male who presents for   Chief Complaint   Patient presents with    New Patient     had some headaches and slight stomach pain. Both started yesterday. [id number] [facility] [place]     will discuss with provider    and follow up of chronic medical problems. Patient Active Problem List   Diagnosis    Dyslipidemia    Hammer toe    Unstable gait    Tobacco abuse    Lumbar back sprain    Osteoarthritis of lumbosacral spine    Primary osteoarthritis of both knees    Primary osteoarthritis of both shoulders    Erectile dysfunction    S/P rotator cuff repair     HPI  Here to establish as a new patient and patient was seeing Dr. Albert Kaye in the past who since retired and patient works in [employer] for the last 24 years and patient was in [facility] prior to that and patient is never  has no children and does not drink alcohol on a regular basis and does smoke 1 or 2 cigarettes a day  Patient has no family history and is only some to his parents and his father and mother were also only children to their parents and both were   Patient complained of for this headache and stomach discomfort and loose stools for the last 24 hours but now getting better    Current Outpatient Medications   Medication Sig Dispense Refill    ibuprofen (ADVIL;MOTRIN) 800 MG tablet Take 1 tablet by mouth every 8 hours as needed for Pain 270 tablet 1    hydrocortisone 2.5 % cream Apply topically 2 times daily. 20 g 1    cetirizine (ZYRTEC ALLERGY) 10 MG tablet Take 1 tablet by mouth daily 14 tablet 0    VIAGRA 100 MG tablet TAKE 1 TABLET AS NEEDED 36 tablet 3    Handicap Placard MISC by Does not apply route Please provide with handicap placard for 5 years 1 each 0     No current facility-administered medications for this visit.         Allergies   Allergen Reactions    Percocet [Oxycodone-Acetaminophen] Other (See Comments)     Insomnia and anxiety    Shrimp Flavor Nausea And Vomiting     all sea food causes Nausea & vomiting         Past Medical History:   Diagnosis Date    CTS (carpal tunnel syndrome) 1999    bilateral    Erectile dysfunction     Hyperlipidemia     Osteoarthritis     bilateral knees and ankles    Wears glasses     Wears partial dentures     Upper & Lower       Past Surgical History:   Procedure Laterality Date    EYE SURGERY      3 yearsa old    KNEE ARTHROSCOPY Bilateral 2000, 2001    SHOULDER SURGERY Left 01/08/16    rotator cuff repair       Family History   Problem Relation Age of Onset    Alzheimer's Disease Mother      ROS   Constitutional:  Negative for fatigue, loss of appetite and unexpected weight change   HEENT            : Negative for neck stiffness and pain, no congestion or sinus pressure   Eyes                : No visual disturbance or pain   Cardiovascular: No chest pain or palpitations or leg swelling   Respiratory      : Negative for cough, shortness of breath or wheezing   Gastrointestinal: Negative for abdominal pain, constipation or diarrhea and bloating No nausea or vomiting   Genitourinary:     No urgency or frequency, no burning or hematuria   Musculoskeletal: Positive for arthralgias, back pain or myalgias   Skin                  : Negative for rash or erythema   Neurological    : Negative for dizziness, weakness, tremors ,light headedness or syncope   Psychiatric       : Negative for dysphoric mood, sleep disturbances, nervous or anxious, or decreased concentration   All other review of systems was negative    Objective  Physical Examination:    Nursing note reviewed    /80 (Site: Left Upper Arm, Position: Sitting, Cuff Size: Large Adult)   Pulse 72   Temp 98.6 °F (37 °C) (Oral)   Ht 6' 3\" (1.905 m)   Wt 208 lb 3.2 oz (94.4 kg)   SpO2 97%   BMI 26.02 kg/m²   BP Readings from Last 3 Encounters:   04/16/19 128/80   08/16/18 117/76   07/10/18 116/72         Constitutional:  Navdeep Carrion is oriented to place, person and time ,appears well-developed and well-nourished  HEENT:  Atraumatic and normocephalic, external ears normal bilaterally, nose normal no oropharyngeal exudate and is clear and moist  Eyes:  EOCM normal; conjunctivae normal; PERRLA bilaterally  Neck:  Normal range of motion, neck supple, no JVD and no thyromegaly  Cardiovascular:  RRR, normal heart sounds and intact distal pulses  Pulmonary:  effort normal and breath sounds normal bilaterally,no wheezes or rales, no respiratory distress  Abdominal:  Soft, non-tender; normal bowel sounds, no masses  Musculoskeletal:  Limited range of motion and no edema or tenderness bilaterally  No lymphadenopathy  Neurological:  alert, oriented, and normal reflexes bilaterally  Skin: warm and dry  Psychiatric:  normal mood and effect; behavior normal.    Labs:   No results found for: LABA1C  Lab Results   Component Value Date    CHOL 190 08/07/2018     Lab Results   Component Value Date    HDL 65 08/07/2018     No results found for: [address]  Lab Results   Component Value Date    TRIG 95 08/07/2018     No components found for: [place], [place]  Lab Results   Component Value Date    WBC 4.6 08/07/2018    HGB 13.3 (L) 08/07/2018    HCT 40.1 (L) 08/07/2018    MCV 98.6 08/07/2018     08/07/2018     No results found for: INR, PROTIME  Lab Results   Component Value Date    GLUCOSE 91 08/07/2018    CREATININE 1.03 08/07/2018    BUN 17 08/07/2018     08/07/2018    K 4.3 08/07/2018     (H) 08/07/2018    CO2 24 08/07/2018     Lab Results   Component Value Date    ALT 26 08/07/2018    AST 25 08/07/2018    ALKPHOS 66 08/07/2018    BILITOT 0.38 08/07/2018     Lab Results   Component Value Date    LABPROT 7.4 09/19/2012    LABALBU 4.4 08/07/2018     No results found for: TSH, CBC  Assessment:   Diagnosis Orders   1. Erectile dysfunction, unspecified erectile dysfunction type  Comprehensive Metabolic Panel    Lipid Panel    TSH without Reflex    CBC    Psa screening   2.  Osteoarthritis of lumbosacral spine  Comprehensive Metabolic Panel    Lipid Panel    TSH without Reflex    CBC    Psa screening   3. Stomach flu           Plan:  Patient had symptoms of stomach flu and advised him to increase fluids take Tylenol as needed and call me back in 2448 hrs. if symptoms do not improve or go to the emergency room if getting any worse  Patient's history reviewed and patient advised to continue Advil or Tylenol as needed and patient is following with Dr. Bonilla Davis orthopedics for his arthritis and hip pain and has been seeing him for the last 20 years  Patient uses Viagra as needed for erectile dysfunction does not need any refills today  Labs ordered to check for PSA FLP CMP and CBC  Review in 4 months           1. Shereen Valdez received counseling on the following healthy behaviors: nutrition and exercise    2. Prior labs and health maintenance reviewed. 3.  Discussed use, benefit, and side effects of prescribed medications. Barriers to medication compliance addressed. All his questions were answered. Pt voiced understanding. Shereen Valdez will continue current medications, diet and exercise. No orders of the defined types were placed in this encounter. Completed Refills               Requested Prescriptions      No prescriptions requested or ordered in this encounter     4. Patient given educational materials - see patient instructions    5. Was a self-tracking handout given in paper form or via [employer]?   NO    Orders Placed This Encounter   Procedures    Comprehensive Metabolic Panel     Standing Status:   Future     Standing Expiration Date:   4/15/2020    Lipid Panel     Standing Status:   Future     Standing Expiration Date:   4/15/2020     Order Specific Question:   Is Patient Fasting?/# of Hours     Answer:   15    TSH without Reflex     Standing Status:   Future     Standing Expiration Date:   4/15/2020    CBC     Standing Status:   Future     Standing Expiration Date:   4/15/2020  Psa screening     Standing Status:   Future     Standing Expiration Date:   4/15/2020     Return in about 4 months (around 8/16/2019). Patient voiced understanding and agreed to treatment plan. Electronically signed by Monica Grayson MD on 4/16/2019 at 10:30 AM    This note is created with a voice recognition program and while intend to generate a document that accurately reflects the content of the visit, no guarantee can be provided that every mistake has been identified and corrected by editing.

## 2019-05-01 ENCOUNTER — HOSPITAL ENCOUNTER (OUTPATIENT)
Age: 64
Discharge: HOME | End: 2019-05-01
Payer: COMMERCIAL

## 2019-05-01 DIAGNOSIS — M47.817 OSTEOARTHRITIS OF LUMBOSACRAL SPINE: ICD-10-CM

## 2019-05-01 DIAGNOSIS — N52.9 ERECTILE DYSFUNCTION, UNSPECIFIED ERECTILE DYSFUNCTION TYPE: ICD-10-CM

## 2019-05-01 LAB
ALBUMIN SERPL-MCNC: 4.6 G/DL (ref 3.5–5.2)
ALBUMIN/GLOBULIN RATIO: 1.5 (ref 1–2.5)
ALP BLD-CCNC: 69 U/L (ref 40–129)
ALT SERPL-CCNC: 31 U/L (ref 5–41)
ANION GAP SERPL CALCULATED.3IONS-SCNC: 12 MMOL/L (ref 9–17)
AST SERPL-CCNC: 29 U/L
BILIRUB SERPL-MCNC: 0.24 MG/DL (ref 0.3–1.2)
BUN BLDV-MCNC: 17 MG/DL (ref 8–23)
BUN/CREAT BLD: ABNORMAL (ref 9–20)
CALCIUM SERPL-MCNC: 9.7 MG/DL (ref 8.6–10.4)
CHLORIDE BLD-SCNC: 105 MMOL/L (ref 98–107)
CHOLESTEROL/HDL RATIO: 3.1
CHOLESTEROL: 195 MG/DL
CO2: 23 MMOL/L (ref 20–31)
CREAT SERPL-MCNC: 1.01 MG/DL (ref 0.7–1.2)
GFR AFRICAN AMERICAN: >60 ML/MIN
GFR NON-AFRICAN AMERICAN: >60 ML/MIN
GFR SERPL CREATININE-BSD FRML MDRD: ABNORMAL ML/MIN/{1.73_M2}
GFR SERPL CREATININE-BSD FRML MDRD: ABNORMAL ML/MIN/{1.73_M2}
GLUCOSE BLD-MCNC: 103 MG/DL (ref 70–99)
HCT VFR BLD CALC: 40.2 % (ref 40.7–50.3)
HDLC SERPL-MCNC: 62 MG/DL
HEMOGLOBIN: 13.4 G/DL (ref 13–17)
LDL CHOLESTEROL: 119 MG/DL (ref 0–130)
MCH RBC QN AUTO: 32.9 PG (ref 25.2–33.5)
MCHC RBC AUTO-ENTMCNC: 33.3 G/DL (ref 28.4–34.8)
MCV RBC AUTO: 98.8 FL (ref 82.6–102.9)
NRBC AUTOMATED: 0 PER 100 WBC
PDW BLD-RTO: 13.4 % (ref 11.8–14.4)
PLATELET # BLD: 264 K/UL (ref 138–453)
PMV BLD AUTO: 10.3 FL (ref 8.1–13.5)
POTASSIUM SERPL-SCNC: 4.1 MMOL/L (ref 3.7–5.3)
PROSTATE SPECIFIC ANTIGEN: 1.3 UG/L
RBC # BLD: 4.07 M/UL (ref 4.21–5.77)
SODIUM BLD-SCNC: 140 MMOL/L (ref 135–144)
TOTAL PROTEIN: 7.6 G/DL (ref 6.4–8.3)
TRIGL SERPL-MCNC: 68 MG/DL
TSH SERPL DL<=0.05 MIU/L-ACNC: 1.52 MIU/L (ref 0.3–5)
VLDLC SERPL CALC-MCNC: NORMAL MG/DL (ref 1–30)
WBC # BLD: 4.8 K/UL (ref 3.5–11.3)

## 2019-05-01 PROCEDURE — 84443 ASSAY THYROID STIM HORMONE: CPT

## 2019-05-01 PROCEDURE — 36415 COLL VENOUS BLD VENIPUNCTURE: CPT

## 2019-05-01 PROCEDURE — G0103 PSA SCREENING: HCPCS

## 2019-05-01 PROCEDURE — 85027 COMPLETE CBC AUTOMATED: CPT

## 2019-05-01 PROCEDURE — 80053 COMPREHEN METABOLIC PANEL: CPT

## 2019-05-01 PROCEDURE — 80061 LIPID PANEL: CPT

## 2019-08-16 ENCOUNTER — OFFICE VISIT (OUTPATIENT)
Dept: INTERNAL MEDICINE CLINIC | Age: 64
End: 2019-08-16
Payer: COMMERCIAL

## 2019-08-16 VITALS
BODY MASS INDEX: 26.38 KG/M2 | HEART RATE: 61 BPM | DIASTOLIC BLOOD PRESSURE: 60 MMHG | OXYGEN SATURATION: 97 % | HEIGHT: 75 IN | WEIGHT: 212.2 LBS | TEMPERATURE: 97.4 F | SYSTOLIC BLOOD PRESSURE: 110 MMHG

## 2019-08-16 DIAGNOSIS — M25.532 LEFT WRIST PAIN: Primary | ICD-10-CM

## 2019-08-16 DIAGNOSIS — Z23 NEED FOR PNEUMOCOCCAL VACCINE: ICD-10-CM

## 2019-08-16 PROCEDURE — 90471 IMMUNIZATION ADMIN: CPT | Performed by: INTERNAL MEDICINE

## 2019-08-16 PROCEDURE — 99213 OFFICE O/P EST LOW 20 MIN: CPT | Performed by: INTERNAL MEDICINE

## 2019-08-16 PROCEDURE — 90670 PCV13 VACCINE IM: CPT | Performed by: INTERNAL MEDICINE

## 2019-08-16 NOTE — PROGRESS NOTES
 SHOULDER SURGERY Left 01/08/16    rotator cuff repair       Family History   Problem Relation Age of Onset    Alzheimer's Disease Mother      ROS   Constitutional:  Negative for fatigue, loss of appetite and unexpected weight change   HEENT            : Negative for neck stiffness and pain, no congestion or sinus pressure   Eyes                : No visual disturbance or pain   Cardiovascular: No chest pain or palpitations or leg swelling   Respiratory      : Negative for cough, shortness of breath or wheezing   Gastrointestinal: Negative for abdominal pain, constipation or diarrhea and bloating No nausea or vomiting   Genitourinary:     No urgency or frequency, no burning or hematuria   Musculoskeletal: No arthralgias, back pain or myalgias   Skin                  : Negative for rash or erythema   Neurological    : Negative for dizziness, weakness, tremors ,light headedness or syncope   Psychiatric       : Negative for dysphoric mood, sleep disturbances, nervous or anxious, or decreased concentration   All other review of systems was negative    Objective  Physical Examination:    Nursing note reviewed    /60 (Site: Right Upper Arm, Position: Sitting, Cuff Size: Medium Adult)   Pulse 61   Temp 97.4 °F (36.3 °C)   Ht 6' 3\" (1.905 m)   Wt 212 lb 3.2 oz (96.3 kg)   SpO2 97%   BMI 26.52 kg/m²   BP Readings from Last 3 Encounters:   08/16/19 110/60   04/16/19 128/80   08/16/18 117/76         Constitutional:  Huber Hooker is oriented to place, person and time ,appears well-developed and well-nourished  HEENT:  Atraumatic and normocephalic, external ears normal bilaterally, nose normal no oropharyngeal exudate and is clear and moist  Eyes:  EOCM normal; conjunctivae normal; PERRLA bilaterally  Neck:  Normal range of motion, neck supple, no JVD and no thyromegaly  Cardiovascular:  RRR, normal heart sounds and intact distal pulses  Pulmonary:  effort normal and breath sounds normal bilaterally,no

## 2019-08-29 ENCOUNTER — OFFICE VISIT (OUTPATIENT)
Dept: ORTHOPEDIC SURGERY | Age: 64
End: 2019-08-29
Payer: COMMERCIAL

## 2019-08-29 VITALS — BODY MASS INDEX: 26.4 KG/M2 | HEIGHT: 75 IN | WEIGHT: 212.3 LBS

## 2019-08-29 DIAGNOSIS — M19.032 PRIMARY OSTEOARTHRITIS OF LEFT WRIST: Primary | ICD-10-CM

## 2019-08-29 DIAGNOSIS — M19.132 POST-TRAUMATIC OSTEOARTHRITIS OF LEFT WRIST: ICD-10-CM

## 2019-08-29 PROCEDURE — 99213 OFFICE O/P EST LOW 20 MIN: CPT | Performed by: ORTHOPAEDIC SURGERY

## 2019-08-29 RX ORDER — HYDROCODONE BITARTRATE AND ACETAMINOPHEN 5; 325 MG/1; MG/1
1 TABLET ORAL EVERY 8 HOURS PRN
Qty: 42 TABLET | Refills: 0 | Status: SHIPPED | OUTPATIENT
Start: 2019-08-29 | End: 2019-09-12

## 2019-08-29 NOTE — PROGRESS NOTES
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 Patient was diagnosed with a missed scaphoid fracture with subsequent primary posttraumatic osteoarthritis of the wrist joint. Patient states that that when he made the appointment he has severe pain in the wrist and could not do any work. He works at [employer]. His job involves lifting pars which the heaviest could be about 8 pounds but most of the time it is less than that. He is quite happy in the department that he is working. He is now in the first shift but would prefer to go on to second shift. Examination: Today there was no swelling of the wrist and he had excellent range of motion except for mild limitation of radial and ulnar deviation and may be at the most 5 degrees of lack of dorsiflexion compared to the other side. His rotation is equal bilaterally. I again reviewed his x-rays which confirmed chronic posttraumatic osteoarthritis. Treatment: I discussed with him that at this stage his symptoms are not enough to justify surgical treatment. He is back at work. I have given him a prescription for Norco and if he gets severe pain he can take this over a couple of days and then if the pain does not clair then he should return to the office.

## 2019-11-06 DIAGNOSIS — M19.132 POST-TRAUMATIC OSTEOARTHRITIS OF LEFT WRIST: Primary | ICD-10-CM

## 2019-11-07 RX ORDER — IBUPROFEN 800 MG/1
800 TABLET ORAL EVERY 8 HOURS PRN
Qty: 90 TABLET | Refills: 2 | Status: SHIPPED
Start: 2019-11-07 | End: 2020-02-18 | Stop reason: SDUPTHER

## 2019-11-21 ENCOUNTER — OFFICE VISIT (OUTPATIENT)
Dept: ORTHOPEDIC SURGERY | Age: 64
End: 2019-11-21
Payer: COMMERCIAL

## 2019-11-21 VITALS — HEIGHT: 75 IN | BODY MASS INDEX: 27.35 KG/M2 | WEIGHT: 220 LBS

## 2019-11-21 DIAGNOSIS — M19.032 PRIMARY OSTEOARTHRITIS OF LEFT WRIST: Primary | ICD-10-CM

## 2019-11-21 DIAGNOSIS — M19.132 POST-TRAUMATIC OSTEOARTHRITIS OF LEFT WRIST: ICD-10-CM

## 2019-11-21 PROCEDURE — 99212 OFFICE O/P EST SF 10 MIN: CPT | Performed by: ORTHOPAEDIC SURGERY

## 2019-11-21 PROCEDURE — 20605 DRAIN/INJ JOINT/BURSA W/O US: CPT | Performed by: ORTHOPAEDIC SURGERY

## 2020-02-18 ENCOUNTER — OFFICE VISIT (OUTPATIENT)
Dept: INTERNAL MEDICINE CLINIC | Age: 65
End: 2020-02-18
Payer: COMMERCIAL

## 2020-02-18 VITALS
BODY MASS INDEX: 28.95 KG/M2 | WEIGHT: 232.8 LBS | HEART RATE: 66 BPM | OXYGEN SATURATION: 97 % | SYSTOLIC BLOOD PRESSURE: 118 MMHG | TEMPERATURE: 98.4 F | HEIGHT: 75 IN | DIASTOLIC BLOOD PRESSURE: 78 MMHG

## 2020-02-18 PROCEDURE — 93000 ELECTROCARDIOGRAM COMPLETE: CPT | Performed by: INTERNAL MEDICINE

## 2020-02-18 PROCEDURE — 99213 OFFICE O/P EST LOW 20 MIN: CPT | Performed by: INTERNAL MEDICINE

## 2020-02-18 ASSESSMENT — PATIENT HEALTH QUESTIONNAIRE - PHQ9
2. FEELING DOWN, DEPRESSED OR HOPELESS: 0
SUM OF ALL RESPONSES TO PHQ QUESTIONS 1-9: 0
1. LITTLE INTEREST OR PLEASURE IN DOING THINGS: 0
SUM OF ALL RESPONSES TO PHQ9 QUESTIONS 1 & 2: 0
SUM OF ALL RESPONSES TO PHQ QUESTIONS 1-9: 0

## 2020-02-18 NOTE — PROGRESS NOTES
Beni Blanca is a 59 y.o. male who presents for   Chief Complaint   Patient presents with    6 Month Follow-Up     Pt c/o bilat wrist pain says it hurts to drive. He says he needs to see Dr Robert Fishman soon for his wrist., will make an appt pt says    Rash     Says it is under both armpits. Asking for cream     and follow up of chronic medical problems. Patient Active Problem List   Diagnosis    Dyslipidemia    Hammer toe    Unstable gait    Tobacco abuse    Lumbar back sprain    Primary osteoarthritis of left wrist    Primary osteoarthritis of both knees    Primary osteoarthritis of both shoulders    Erectile dysfunction    S/P rotator cuff repair    Post-traumatic osteoarthritis of left wrist     HPI  Here for follow-up on arthritis and bilateral wrist pain denies any complaints other than the pain and taking ibuprofen and will follow with orthopedics    Current Outpatient Medications   Medication Sig Dispense Refill    ibuprofen (ADVIL;MOTRIN) 800 MG tablet Take 1 tablet by mouth every 8 hours as needed for Pain 270 tablet 1    hydrocortisone 2.5 % cream Apply topically 2 times daily. 20 g 1    VIAGRA 100 MG tablet TAKE 1 TABLET AS NEEDED 36 tablet 3    Handicap Placard MISC by Does not apply route Please provide with handicap placard for 5 years 1 each 0    cetirizine (ZYRTEC ALLERGY) 10 MG tablet Take 1 tablet by mouth daily (Patient not taking: Reported on 8/16/2019) 14 tablet 0     No current facility-administered medications for this visit.         Allergies   Allergen Reactions    Percocet [Oxycodone-Acetaminophen] Other (See Comments)     Insomnia and anxiety    Shrimp Flavor Nausea And Vomiting     all sea food causes Nausea & vomiting         Past Medical History:   Diagnosis Date    CTS (carpal tunnel syndrome) 1999    bilateral    Erectile dysfunction     Hyperlipidemia     Osteoarthritis     bilateral knees and ankles    Wears glasses     Wears partial dentures     Upper & Lower in the office and it is negative for any irregular rhythm but shows nonspecific T wave changes and will do a stress echo to evaluate  Review in 6 months           1. Moriah Coronado received counseling on the following healthy behaviors: nutrition and exercise    2. Prior labs and health maintenance reviewed. 3.  Discussed use, benefit, and side effects of prescribed medications. Barriers to medication compliance addressed. All his questions were answered. Pt voiced understanding. Moriah Coronado will continue current medications, diet and exercise. No orders of the defined types were placed in this encounter. Completed Refills               Requested Prescriptions      No prescriptions requested or ordered in this encounter     4. Patient given educational materials - see patient instructions    5. Was a self-tracking handout given in paper form or via [employer]? NO    No orders of the defined types were placed in this encounter. No follow-ups on file. Patient voiced understanding and agreed to treatment plan. Electronically signed by Ivory Murrieta MD on 2/18/2020 at 10:11 AM    This note is created with a voice recognition program and while intend to generate a document that accurately reflects the content of the visit, no guarantee can be provided that every mistake has been identified and corrected by editing.

## 2020-05-12 ENCOUNTER — TELEPHONE (OUTPATIENT)
Dept: INTERNAL MEDICINE CLINIC | Age: 65
End: 2020-05-12

## 2020-05-12 RX ORDER — TIZANIDINE 4 MG/1
4 TABLET ORAL 2 TIMES DAILY
Qty: 10 TABLET | Refills: 0 | Status: SHIPPED | OUTPATIENT
Start: 2020-05-12 | End: 2020-06-01 | Stop reason: ALTCHOICE

## 2020-05-12 RX ORDER — PREDNISONE 10 MG/1
10 TABLET ORAL
Qty: 15 TABLET | Refills: 0 | Status: SHIPPED | OUTPATIENT
Start: 2020-05-12 | End: 2020-05-17

## 2020-05-12 NOTE — TELEPHONE ENCOUNTER
Patient calling to see if he can get a order for an x-ray of neck he woke up this morning with a stiff neck

## 2020-05-13 ENCOUNTER — HOSPITAL ENCOUNTER (OUTPATIENT)
Age: 65
Discharge: HOME | End: 2020-05-15
Payer: COMMERCIAL

## 2020-05-13 ENCOUNTER — TELEPHONE (OUTPATIENT)
Dept: INTERNAL MEDICINE CLINIC | Age: 65
End: 2020-05-13

## 2020-05-13 ENCOUNTER — HOSPITAL ENCOUNTER (OUTPATIENT)
Dept: GENERAL RADIOLOGY | Age: 65
Discharge: HOME | End: 2020-05-15
Payer: COMMERCIAL

## 2020-05-13 PROCEDURE — 72040 X-RAY EXAM NECK SPINE 2-3 VW: CPT

## 2020-05-29 ENCOUNTER — TELEPHONE (OUTPATIENT)
Dept: INTERNAL MEDICINE CLINIC | Age: 65
End: 2020-05-29

## 2020-06-02 ENCOUNTER — TELEPHONE (OUTPATIENT)
Dept: INTERNAL MEDICINE CLINIC | Age: 65
End: 2020-06-02

## 2020-09-26 RX ORDER — IBUPROFEN 800 MG/1
800 TABLET ORAL EVERY 8 HOURS PRN
Qty: 270 TABLET | Refills: 1 | Status: SHIPPED | OUTPATIENT
Start: 2020-09-26 | End: 2022-02-09

## 2020-10-06 ENCOUNTER — OFFICE VISIT (OUTPATIENT)
Dept: ORTHOPEDIC SURGERY | Age: 65
End: 2020-10-06
Payer: COMMERCIAL

## 2020-10-06 VITALS — HEIGHT: 75 IN | BODY MASS INDEX: 28.85 KG/M2 | WEIGHT: 232 LBS

## 2020-10-06 PROCEDURE — 99212 OFFICE O/P EST SF 10 MIN: CPT | Performed by: ORTHOPAEDIC SURGERY

## 2020-10-06 NOTE — PROGRESS NOTES
Chief Complaint   Patient presents with    Follow-up     LT wrist pain   This patient says that he is doing fine with the wrist.  The patient was seen last year in November with severe osteoarthritis of the left wrist and is as a consequence of SLAC lesion    The patient says that he is now in a different type of job which involves a lot of walking and not doing very much with his hands. Since then his symptoms have significantly improved and he takes only Motrin sometimes just once a day. Examination: Surprisingly he has excellent range of motion in all the direction except for flexion extension which is slightly limited. Diagnosis: SLAC lesion of the left wrist.    Treatment: Advised to continue with the Motrin and also continue dorsiflexion flexion exercises to improve range of motion in that direction. Motrin was prescribed yesterday. We will see him as necessary.

## 2021-09-07 ENCOUNTER — OFFICE VISIT (OUTPATIENT)
Dept: ORTHOPEDIC SURGERY | Age: 66
End: 2021-09-07
Payer: COMMERCIAL

## 2021-09-07 VITALS — HEIGHT: 75 IN | BODY MASS INDEX: 28.85 KG/M2 | WEIGHT: 232 LBS

## 2021-09-07 DIAGNOSIS — M19.031 ARTHRITIS OF WRIST, RIGHT: Primary | ICD-10-CM

## 2021-09-07 DIAGNOSIS — M19.132 POST-TRAUMATIC OSTEOARTHRITIS OF LEFT WRIST: ICD-10-CM

## 2021-09-07 PROCEDURE — 20605 DRAIN/INJ JOINT/BURSA W/O US: CPT | Performed by: ORTHOPAEDIC SURGERY

## 2021-09-07 PROCEDURE — 99212 OFFICE O/P EST SF 10 MIN: CPT | Performed by: ORTHOPAEDIC SURGERY

## 2021-09-07 RX ORDER — METHYLPREDNISOLONE ACETATE 40 MG/ML
40 INJECTION, SUSPENSION INTRA-ARTICULAR; INTRALESIONAL; INTRAMUSCULAR; SOFT TISSUE ONCE
Status: SHIPPED | OUTPATIENT
Start: 2021-09-07

## 2021-09-07 RX ORDER — LIDOCAINE HYDROCHLORIDE 10 MG/ML
6 INJECTION, SOLUTION INFILTRATION; PERINEURAL ONCE
Status: SHIPPED | OUTPATIENT
Start: 2021-09-07

## 2021-09-07 NOTE — PROGRESS NOTES
Chief Complaint   Patient presents with    Follow-up     BILATERAL WRIST PAIN and NUMBNESS / LAST CTS INJECTION 11/2019   This patient is seen here today complaining of pain in both the wrist.    Previously he had corticosteroid injection in the left wrist.    Patient does now recall an injury to the right little finger several years ago. He did not really get treated for this. He said the finger looked awkward and stick stuck up in the back of the hand. The patient is still continued to work and has another 14 months to go before long term. Examination: Both ulnar styloids are prominent. He still has excellent motion in the wrist.  Supination was limited. All the motions were very painful. X-rays: Further x-rays were taken of the right wrist today and again that shows similar situation to the left one. Nonunited scaphoid fracture along with a SLAC lesion of the wrist.    Diagnosis: Bilateral SLAC lesions of the wrist.    Treatment: Under sterile condition I injected both the wrist with 40 mg Depo-Medrol and 3 cc of 1% plain lidocaine with excellent response. I will see him as needed.

## 2021-11-01 DIAGNOSIS — M17.0 PRIMARY OSTEOARTHRITIS OF BOTH KNEES: Primary | ICD-10-CM

## 2021-11-08 ENCOUNTER — OFFICE VISIT (OUTPATIENT)
Dept: ORTHOPEDIC SURGERY | Age: 66
End: 2021-11-08
Payer: COMMERCIAL

## 2021-11-08 DIAGNOSIS — M17.0 PRIMARY OSTEOARTHRITIS OF BOTH KNEES: Primary | ICD-10-CM

## 2021-11-08 PROCEDURE — 20610 DRAIN/INJ JOINT/BURSA W/O US: CPT | Performed by: ORTHOPAEDIC SURGERY

## 2021-11-08 PROCEDURE — 99212 OFFICE O/P EST SF 10 MIN: CPT | Performed by: ORTHOPAEDIC SURGERY

## 2021-11-08 RX ORDER — METHYLPREDNISOLONE ACETATE 40 MG/ML
40 INJECTION, SUSPENSION INTRA-ARTICULAR; INTRALESIONAL; INTRAMUSCULAR; SOFT TISSUE ONCE
Status: COMPLETED | OUTPATIENT
Start: 2021-11-08 | End: 2021-11-08

## 2021-11-08 RX ORDER — LIDOCAINE HYDROCHLORIDE 10 MG/ML
10 INJECTION, SOLUTION INFILTRATION; PERINEURAL ONCE
Qty: 10 ML | Refills: 0 | Status: SHIPPED | OUTPATIENT
Start: 2021-11-08 | End: 2021-11-08

## 2021-11-08 RX ADMIN — METHYLPREDNISOLONE ACETATE 40 MG: 40 INJECTION, SUSPENSION INTRA-ARTICULAR; INTRALESIONAL; INTRAMUSCULAR; SOFT TISSUE at 12:50

## 2021-11-08 RX ADMIN — METHYLPREDNISOLONE ACETATE 40 MG: 40 INJECTION, SUSPENSION INTRA-ARTICULAR; INTRALESIONAL; INTRAMUSCULAR; SOFT TISSUE at 12:49

## 2021-11-09 NOTE — PROGRESS NOTES
This patient who is well-known to us is returning because of pain in both the knees. He had been seen regarding this several years ago. Last time she was here he was reevaluated for shoulder and the shoulder is now asymptomatic. Patient says that where he is working now he is doing a different job which requires him to do a lot of walking on hard cement floor. After about 2 hours he starts limping. At the end of the day the knees are very painful. The left is worse than the right. The pain is generalized around the both the knees. There is no radiation of pain and no paresthesia. The knees do tend to swell up. He says that he is going to retire at the end of 21 22. Examination: Both hips show excellent motion without pain. The right knee has a flexion from about 5 degrees to 105 degrees. The left knee has a flexion from about 7 degrees to 100 degrees. There is no instability of the patellofemoral or tibiofemoral joints. X-rays: X-rays revealed patient has bilateral significant medial and patellofemoral compartment osteoarthrosis with right side being worse than the left. However his symptoms more more on the left side. Diagnosis: Bilateral medial and patellofemoral compartment osteoarthrosis. Treatment: Under sterile condition both the knees were injected with 40 mg Depo-Medrol and 5 cc of 1% plain lidocaine through anterolateral portal without any complications. He had immediate excellent response to this. I will see him again in 3 weeks time but should his symptoms have subsided then he will call up and cancel the appointment.

## 2021-12-14 ENCOUNTER — OFFICE VISIT (OUTPATIENT)
Dept: ORTHOPEDIC SURGERY | Age: 66
End: 2021-12-14
Payer: COMMERCIAL

## 2021-12-14 VITALS — WEIGHT: 232 LBS | HEIGHT: 75 IN | BODY MASS INDEX: 28.85 KG/M2

## 2021-12-14 DIAGNOSIS — M17.0 PRIMARY OSTEOARTHRITIS OF BOTH KNEES: Primary | ICD-10-CM

## 2021-12-14 PROCEDURE — 99212 OFFICE O/P EST SF 10 MIN: CPT | Performed by: ORTHOPAEDIC SURGERY

## 2021-12-14 NOTE — PROGRESS NOTES
This patient is returning here because of recurrence of pain in his left knee. He had corticosteroid injection about 5 weeks ago. He says the injection lasted maybe about a week or 2 at the most.    The pain is felt mostly on the lateral side. And he says the knee does sublux. He describes this giving an motion about subluxation. Examination: There is no effusion in the joint. However he has a flexion contracture of about 10 degrees and then flexes to the best 90 degrees. On the right side he has a flexion contracture about 5 degrees and flexes to about 110 degrees. There is a lot of grinding in the knees bilaterally. In standing position there is mild valgus deformity of the left knee and varus of the right knee. When he is walking I could not see any medial or lateral thrust.    I reviewed his x-rays which show significant valgus osteoarthritis of the left knee with patellofemoral involvement. Diagnosis: Valgus osteoarthritis right knee. Treatment: Since the corticosteroid injections did not work we will try for Synvisc 1. However we need preauthorization and insurance company before proceeding with it. Did discuss with patient about knee replacement. However he says he is going to retire in December 2022 and he would like to get it done just before that. I did discuss with him that if Synvisc 1 does not work out then he may have to consider getting the knee replacement done before December next year.

## 2022-02-09 DIAGNOSIS — M19.011 PRIMARY OSTEOARTHRITIS OF BOTH SHOULDERS: ICD-10-CM

## 2022-02-09 DIAGNOSIS — M19.012 PRIMARY OSTEOARTHRITIS OF BOTH SHOULDERS: ICD-10-CM

## 2022-02-09 DIAGNOSIS — M17.0 PRIMARY OSTEOARTHRITIS OF BOTH KNEES: ICD-10-CM

## 2022-02-09 RX ORDER — IBUPROFEN 800 MG/1
TABLET ORAL
Qty: 90 TABLET | Refills: 0 | Status: SHIPPED | OUTPATIENT
Start: 2022-02-09

## 2022-02-11 ENCOUNTER — TELEPHONE (OUTPATIENT)
Dept: ORTHOPEDIC SURGERY | Age: 67
End: 2022-02-11

## 2022-02-11 DIAGNOSIS — M19.132 POST-TRAUMATIC OSTEOARTHRITIS OF LEFT WRIST: ICD-10-CM

## 2022-02-11 DIAGNOSIS — M19.012 PRIMARY OSTEOARTHRITIS OF BOTH SHOULDERS: ICD-10-CM

## 2022-02-11 DIAGNOSIS — M17.0 PRIMARY OSTEOARTHRITIS OF BOTH KNEES: Primary | ICD-10-CM

## 2022-02-11 DIAGNOSIS — M19.011 PRIMARY OSTEOARTHRITIS OF BOTH SHOULDERS: ICD-10-CM

## 2022-02-11 NOTE — TELEPHONE ENCOUNTER
Patient would like a handicap placard. If you agree please tell me how long you would want it for and I can have you sign when I see you next. Thanks.

## 2022-06-20 ENCOUNTER — OFFICE VISIT (OUTPATIENT)
Dept: ORTHOPEDIC SURGERY | Age: 67
End: 2022-06-20
Payer: COMMERCIAL

## 2022-06-20 VITALS — BODY MASS INDEX: 28.85 KG/M2 | HEIGHT: 75 IN | WEIGHT: 232 LBS

## 2022-06-20 DIAGNOSIS — M19.032 PRIMARY OSTEOARTHRITIS OF LEFT WRIST: ICD-10-CM

## 2022-06-20 DIAGNOSIS — M19.031 ARTHRITIS OF WRIST, RIGHT: Primary | ICD-10-CM

## 2022-06-20 PROCEDURE — 20605 DRAIN/INJ JOINT/BURSA W/O US: CPT | Performed by: ORTHOPAEDIC SURGERY

## 2022-06-20 PROCEDURE — 99213 OFFICE O/P EST LOW 20 MIN: CPT | Performed by: ORTHOPAEDIC SURGERY

## 2022-06-20 PROCEDURE — 1123F ACP DISCUSS/DSCN MKR DOCD: CPT | Performed by: ORTHOPAEDIC SURGERY

## 2022-06-20 NOTE — PROGRESS NOTES
This patient is seen here today because of recurrence of pain in both the wrist.  Patient is known to have SLAC lesions both the wrist.  He has previous corticosteroid injection with good results. The last one was done it was about September 2021. The patient also has pain in the knees but is not as bad. The last time he was seen was in approximately November and the since the cortisone injections were not working he had sought to get authorization for Synvisc 1. However that has not been materialized and Therese Velazquez is going to check into it now. Examination: Both the wrist appeared swollen. Motions were painful but still has excellent motion. Diagnosis: Bilateral slack lesions of the wrist.    Treatment: Under sterile conditions I injected 40 mg of Depo-Medrol and 3 cc of 1% plain lidocaine into each wrist joint without any complication and he had excellent immediate response. We will see him again as needed.

## 2022-06-29 ENCOUNTER — OFFICE VISIT (OUTPATIENT)
Dept: ORTHOPEDIC SURGERY | Age: 67
End: 2022-06-29
Payer: COMMERCIAL

## 2022-06-29 VITALS — BODY MASS INDEX: 28.85 KG/M2 | HEIGHT: 75 IN | WEIGHT: 232 LBS

## 2022-06-29 DIAGNOSIS — M17.11 PRIMARY OSTEOARTHRITIS OF RIGHT KNEE: Primary | ICD-10-CM

## 2022-06-29 PROCEDURE — 20610 DRAIN/INJ JOINT/BURSA W/O US: CPT | Performed by: ORTHOPAEDIC SURGERY

## 2022-06-29 NOTE — PROGRESS NOTES
With known primary osteoarthritis affecting both the knees particularly the lateral compartment is seen here because of continued pain in the knees. Both knees are affected but the right is worse than the left. Examination: His gait is normal.  In standing position there is mild valgus deformity of both the knees. The right is a flexion from about 10 to 100 degrees. Diagnosis: Valgus osteoarthritis affecting both knees. Treatment: Under sterile condition I injected Synvisc 1 in the right knee without any complications. See him next week for injecting the left side.

## 2022-07-05 ENCOUNTER — OFFICE VISIT (OUTPATIENT)
Dept: ORTHOPEDIC SURGERY | Age: 67
End: 2022-07-05
Payer: COMMERCIAL

## 2022-07-05 VITALS — BODY MASS INDEX: 28.85 KG/M2 | WEIGHT: 232 LBS | HEIGHT: 75 IN

## 2022-07-05 DIAGNOSIS — M17.0 PRIMARY OSTEOARTHRITIS OF BOTH KNEES: Primary | ICD-10-CM

## 2022-07-05 PROCEDURE — 20610 DRAIN/INJ JOINT/BURSA W/O US: CPT | Performed by: ORTHOPAEDIC SURGERY

## 2022-07-05 NOTE — PROGRESS NOTES
Chief Complaint   Patient presents with    Follow-up     LEFT KNEE INJECTION - SYNVISC 1   This patient had Synvisc injection in the left knee through anterolateral portal under sterile condition without any complication. He had a similar injection in the right knee last week and he says that his that is feeling better and he finds that he is going up and down the stairs without holding onto the banister and almost running. Advised to cut down on the really maneuver of the stairs. We will see him as needed.

## 2022-07-08 RX ORDER — METHYLPREDNISOLONE ACETATE 40 MG/ML
40 INJECTION, SUSPENSION INTRA-ARTICULAR; INTRALESIONAL; INTRAMUSCULAR; SOFT TISSUE ONCE
Status: COMPLETED | OUTPATIENT
Start: 2022-07-08 | End: 2022-07-08

## 2022-07-08 RX ORDER — LIDOCAINE HYDROCHLORIDE 10 MG/ML
3 INJECTION, SOLUTION INFILTRATION; PERINEURAL ONCE
Status: COMPLETED | OUTPATIENT
Start: 2022-07-08 | End: 2022-07-08

## 2022-07-08 RX ADMIN — METHYLPREDNISOLONE ACETATE 40 MG: 40 INJECTION, SUSPENSION INTRA-ARTICULAR; INTRALESIONAL; INTRAMUSCULAR; SOFT TISSUE at 12:59

## 2022-07-08 RX ADMIN — LIDOCAINE HYDROCHLORIDE 3 ML: 10 INJECTION, SOLUTION INFILTRATION; PERINEURAL at 12:59

## 2022-08-23 ENCOUNTER — OFFICE VISIT (OUTPATIENT)
Dept: ORTHOPEDIC SURGERY | Age: 67
End: 2022-08-23
Payer: COMMERCIAL

## 2022-08-23 VITALS — WEIGHT: 232 LBS | BODY MASS INDEX: 28.85 KG/M2 | HEIGHT: 75 IN

## 2022-08-23 DIAGNOSIS — M19.031 ARTHRITIS OF WRIST, RIGHT: Primary | ICD-10-CM

## 2022-08-23 DIAGNOSIS — M19.132 POST-TRAUMATIC OSTEOARTHRITIS OF LEFT WRIST: ICD-10-CM

## 2022-08-23 PROCEDURE — 1123F ACP DISCUSS/DSCN MKR DOCD: CPT | Performed by: ORTHOPAEDIC SURGERY

## 2022-08-23 PROCEDURE — 99213 OFFICE O/P EST LOW 20 MIN: CPT | Performed by: ORTHOPAEDIC SURGERY

## 2022-08-23 RX ORDER — NAPROXEN 500 MG/1
500 TABLET ORAL 2 TIMES DAILY WITH MEALS
Qty: 60 TABLET | Refills: 5 | Status: SHIPPED | OUTPATIENT
Start: 2022-08-23

## 2022-08-24 NOTE — PROGRESS NOTES
This patient who has been diagnosed with bilateral SLAC lesion of the wrist is seen here complaining of recurrence of pain in the wrist.    The patient recently had corticosteroid injections with good result and he said he wanted to have the injections again but is still too poorly. Additionally the patient used to take Motrin 800 mg 3 tabs a day and he used to get full supply for the ear but now he is only allowed 120 tablets/year. Examination: Both wrist appeared to be swollen. There is no increased heat. He has good range of motion. Diagnosis: Bilateral slack lesions of the wrist.    Treatment: I have put him on Naprosyn 500 mg twice a day. He will call the office in 2 to 3 daysTo let us know if the naproxen is working. If it is not then we will prescribe some other pain medication. Will see him again in 4 weeks.

## 2022-09-20 ENCOUNTER — TELEPHONE (OUTPATIENT)
Dept: ORTHOPEDIC SURGERY | Age: 67
End: 2022-09-20

## 2022-09-20 NOTE — TELEPHONE ENCOUNTER
The patient left a voicemail. I was Unable to hear the reason for call due to background nose . I returned the patients call , no answer . I left a voicemail.

## 2022-09-26 ENCOUNTER — OFFICE VISIT (OUTPATIENT)
Dept: ORTHOPEDIC SURGERY | Age: 67
End: 2022-09-26
Payer: COMMERCIAL

## 2022-09-26 VITALS — WEIGHT: 232 LBS | BODY MASS INDEX: 28.85 KG/M2 | HEIGHT: 75 IN

## 2022-09-26 DIAGNOSIS — M19.031 ARTHRITIS OF WRIST, RIGHT: ICD-10-CM

## 2022-09-26 DIAGNOSIS — M19.132 POST-TRAUMATIC OSTEOARTHRITIS OF LEFT WRIST: Primary | ICD-10-CM

## 2022-09-26 PROCEDURE — 99213 OFFICE O/P EST LOW 20 MIN: CPT | Performed by: ORTHOPAEDIC SURGERY

## 2022-09-26 PROCEDURE — 20605 DRAIN/INJ JOINT/BURSA W/O US: CPT | Performed by: ORTHOPAEDIC SURGERY

## 2022-09-26 PROCEDURE — 1123F ACP DISCUSS/DSCN MKR DOCD: CPT | Performed by: ORTHOPAEDIC SURGERY

## 2022-09-26 RX ORDER — METHYLPREDNISOLONE ACETATE 40 MG/ML
40 INJECTION, SUSPENSION INTRA-ARTICULAR; INTRALESIONAL; INTRAMUSCULAR; SOFT TISSUE ONCE
Status: COMPLETED | OUTPATIENT
Start: 2022-09-26 | End: 2022-09-26

## 2022-09-26 RX ORDER — LIDOCAINE HYDROCHLORIDE 10 MG/ML
3 INJECTION, SOLUTION INFILTRATION; PERINEURAL ONCE
Status: COMPLETED | OUTPATIENT
Start: 2022-09-26 | End: 2022-09-26

## 2022-09-26 RX ADMIN — METHYLPREDNISOLONE ACETATE 40 MG: 40 INJECTION, SUSPENSION INTRA-ARTICULAR; INTRALESIONAL; INTRAMUSCULAR; SOFT TISSUE at 11:37

## 2022-09-26 RX ADMIN — LIDOCAINE HYDROCHLORIDE 3 ML: 10 INJECTION, SOLUTION INFILTRATION; PERINEURAL at 11:35

## 2022-09-26 RX ADMIN — METHYLPREDNISOLONE ACETATE 40 MG: 40 INJECTION, SUSPENSION INTRA-ARTICULAR; INTRALESIONAL; INTRAMUSCULAR; SOFT TISSUE at 11:35

## 2022-09-26 RX ADMIN — LIDOCAINE HYDROCHLORIDE 3 ML: 10 INJECTION, SOLUTION INFILTRATION; PERINEURAL at 11:34

## 2022-09-26 NOTE — PROGRESS NOTES
This patient is returning here today complaining of pain in both the wrists. The patient has previously been diagnosed with SLAC lesion in both the wrist.  Previously he has responded well to corticosteroid injection. Examination: Both the wrist appears swollen. On the left side the ulnar styloid is a lot more prominent than on the right side. Dorsiflexion is limited on both the sides but on the left side is more so than the right. It comes to about the most 20 degrees. Lumbar flexion is full. Ulnar and lateral deviation when also limited. Forearm pronation is excellent. X-rays: I reviewed the previous x-rays of both the wrist with the patient. Discussed with him the pathology of the lesions. Diagnosis: Bilateral SLAC lesions of the wrists. Treatment: Under sterile condition I injected 40 mg Depo-Medrol and 3 cc of 1% plain lidocaine through a dorsal approach in both the wrist joints. He had immediate excellent response to this. His  strength improved considerably. He had no pain in on gripping heart. Advised here in [place] icing both the wrist when he gets home in and I will see him as necessary.

## 2023-08-15 ENCOUNTER — OFFICE VISIT (OUTPATIENT)
Dept: ORTHOPEDIC SURGERY | Age: 68
End: 2023-08-15

## 2023-08-15 VITALS — HEIGHT: 75 IN | BODY MASS INDEX: 24.87 KG/M2 | WEIGHT: 200 LBS

## 2023-08-15 DIAGNOSIS — M19.132 POST-TRAUMATIC OSTEOARTHRITIS OF BOTH WRISTS: Primary | ICD-10-CM

## 2023-08-15 DIAGNOSIS — M19.131 POST-TRAUMATIC OSTEOARTHRITIS OF BOTH WRISTS: Primary | ICD-10-CM

## 2023-08-15 RX ORDER — METHYLPREDNISOLONE ACETATE 40 MG/ML
40 INJECTION, SUSPENSION INTRA-ARTICULAR; INTRALESIONAL; INTRAMUSCULAR; SOFT TISSUE ONCE
Status: SHIPPED | OUTPATIENT
Start: 2023-08-15

## 2023-08-15 RX ORDER — LIDOCAINE HYDROCHLORIDE 10 MG/ML
3 INJECTION, SOLUTION INFILTRATION; PERINEURAL ONCE
Status: SHIPPED | OUTPATIENT
Start: 2023-08-15

## 2023-08-15 NOTE — PROGRESS NOTES
This patient is returning here again because of recurrence of pain in both the wrist.  The patient has been diagnosed with having bilateral SLAC lesions of both the wrist.  Most of the time his pain more like an ache and can be controlled using naproxen. But is getting worse. He gets shooting pain even when he is driving. The patient also has changed his shift at work. He works at [employer]. He is now going to be working from 3-11. Examination: Dorsiflexion is limited to only about 20 degrees bilaterally. Supination is full bilaterally. Pronation is full on the right side but on the left side is three-quarter. His flexion is also limited to about 30 degrees at the most.    Diagnosis: BilateralSlac lesions of the wrist.    Treatment: Under sterile condition injected 40 mg Depo-Medrol and 3 cc of 1% plain lidocaine into each of the wrist joint through the dorsal approach. He had immediate excellent response to this. We will see him as needed.

## 2023-10-27 DIAGNOSIS — M19.131 POST-TRAUMATIC OSTEOARTHRITIS OF BOTH WRISTS: Primary | ICD-10-CM

## 2023-10-27 DIAGNOSIS — M19.132 POST-TRAUMATIC OSTEOARTHRITIS OF BOTH WRISTS: Primary | ICD-10-CM

## 2023-10-30 RX ORDER — NAPROXEN 500 MG/1
500 TABLET ORAL 2 TIMES DAILY WITH MEALS
Qty: 60 TABLET | Refills: 5 | Status: SHIPPED | OUTPATIENT
Start: 2023-10-30 | End: 2024-04-27

## 2024-03-18 ENCOUNTER — OFFICE VISIT (OUTPATIENT)
Dept: ORTHOPEDIC SURGERY | Age: 69
End: 2024-03-18

## 2024-03-18 ENCOUNTER — TELEPHONE (OUTPATIENT)
Dept: ORTHOPEDIC SURGERY | Age: 69
End: 2024-03-18

## 2024-03-18 VITALS — WEIGHT: 197 LBS | BODY MASS INDEX: 24.49 KG/M2 | HEIGHT: 75 IN

## 2024-03-18 DIAGNOSIS — M25.511 RIGHT SHOULDER PAIN, UNSPECIFIED CHRONICITY: Primary | ICD-10-CM

## 2024-03-18 DIAGNOSIS — M12.811 ROTATOR CUFF ARTHROPATHY OF RIGHT SHOULDER: ICD-10-CM

## 2024-03-18 NOTE — TELEPHONE ENCOUNTER
Called patient to move appointment up today with Dr. Abbott, no answer left [facility]

## 2024-03-18 NOTE — PROGRESS NOTES
This patient is seen here today because of pain and inability to move the right shoulder.  He says this has been going on for about 6 weeks.  He has had pain in the shoulder before but 6 weeks ago he abducted his shoulder to get something from the fridge and heard a loud painful pop in the shoulder and since then has not been able to abduct the shoulder without too much pain.    Going through the chart it appears that the patient has had problems with the right shoulder since 2013 when he had an x-ray of the right shoulder for pain.  Patient denies any numbness or tingling.    Examination: Cervical spine motion shows definite limitation of lateral flexion to left and right but without any significant pain.    Right shoulder examination shows actively he can abduct to about 45 degrees and flex about 60 degrees.  Passively he had full motion.    X-rays: Further x-rays show the patient has a rotator cuff arthropathy right shoulder.    Diagnosis: Rotator cuff arthropathy right shoulder.    Treatment: Under sterile condition I injected 40 mg of Depo-Medrol and 5 cc of 1% plain lidocaine in the subacromial space.  After this the patient was able to fully flex and internally rotate but abduction was still limited to about 60 degrees at the best.    I have asked him to continue mobilization and we will see him again in a week's time.

## 2024-03-22 RX ORDER — METHYLPREDNISOLONE ACETATE 40 MG/ML
40 INJECTION, SUSPENSION INTRA-ARTICULAR; INTRALESIONAL; INTRAMUSCULAR; SOFT TISSUE ONCE
Status: SHIPPED | OUTPATIENT
Start: 2024-03-22

## 2024-03-22 RX ORDER — LIDOCAINE HYDROCHLORIDE 10 MG/ML
5 INJECTION, SOLUTION INFILTRATION; PERINEURAL ONCE
Status: SHIPPED | OUTPATIENT
Start: 2024-03-22

## 2024-04-30 ENCOUNTER — OFFICE VISIT (OUTPATIENT)
Dept: ORTHOPEDIC SURGERY | Age: 69
End: 2024-04-30

## 2024-04-30 VITALS — BODY MASS INDEX: 24.74 KG/M2 | WEIGHT: 199 LBS | HEIGHT: 75 IN

## 2024-04-30 DIAGNOSIS — M12.811 ROTATOR CUFF ARTHROPATHY OF RIGHT SHOULDER: ICD-10-CM

## 2024-04-30 DIAGNOSIS — M19.031 ARTHRITIS OF WRIST, RIGHT: Primary | ICD-10-CM

## 2024-04-30 RX ORDER — METHYLPREDNISOLONE ACETATE 40 MG/ML
40 INJECTION, SUSPENSION INTRA-ARTICULAR; INTRALESIONAL; INTRAMUSCULAR; SOFT TISSUE ONCE
Status: COMPLETED | OUTPATIENT
Start: 2024-04-30 | End: 2024-04-30

## 2024-04-30 RX ORDER — LIDOCAINE HYDROCHLORIDE 10 MG/ML
5 INJECTION, SOLUTION INFILTRATION; PERINEURAL ONCE
Status: COMPLETED | OUTPATIENT
Start: 2024-04-30 | End: 2024-04-30

## 2024-04-30 RX ORDER — LIDOCAINE HYDROCHLORIDE 10 MG/ML
3 INJECTION, SOLUTION INFILTRATION; PERINEURAL ONCE
Status: COMPLETED | OUTPATIENT
Start: 2024-04-30 | End: 2024-04-30

## 2024-04-30 RX ADMIN — METHYLPREDNISOLONE ACETATE 40 MG: 40 INJECTION, SUSPENSION INTRA-ARTICULAR; INTRALESIONAL; INTRAMUSCULAR; SOFT TISSUE at 13:51

## 2024-04-30 RX ADMIN — METHYLPREDNISOLONE ACETATE 40 MG: 40 INJECTION, SUSPENSION INTRA-ARTICULAR; INTRALESIONAL; INTRAMUSCULAR; SOFT TISSUE at 13:50

## 2024-04-30 RX ADMIN — LIDOCAINE HYDROCHLORIDE 3 ML: 10 INJECTION, SOLUTION INFILTRATION; PERINEURAL at 13:49

## 2024-04-30 RX ADMIN — LIDOCAINE HYDROCHLORIDE 5 ML: 10 INJECTION, SOLUTION INFILTRATION; PERINEURAL at 13:50

## 2024-04-30 NOTE — PROGRESS NOTES
This patient who is known to have rotator cuff arthropathy right shoulder and SLAP lesion of the right wrist is returning here because of recurrence of pain and mainly the right wrist.  The patient also has a similar lesion on the left wrist but that is not bothering him just now though occasionally it as being painful.    As for the shoulder he still has limited motion but at the moment he can tolerate it.    The pain in the wrist was so bad that he had to take 2 days off work.  He was not able to lift anything or turn the top of the can.    Examination: Shoulder examination shows that he has definitely impingement sign both in the flexion as well as in abduction.  Passively he has full motion.  He is able to abduct with the arm rotated externally.    As for the wrist the pain is in the wrist joint and ulnar deviation as well as extreme pronation caused the pain in the wrist.  There is no pain along the thumb.  Sensation is normal.    Diagnosis:SLACK lesion right wrist.  Old fractures scaphoid.  #2 rotator cuff arthropathy right shoulder.  #3 osteoarthritis left wrist.    Treatment: Under sterile condition I injected 40 mg Depo-Medrol and 3 cc of 1% plain lidocaine in the right wrist through dorsal approach and also injected 40 mg Depo-Medrol and 5 cc of 1% plain lidocaine in the subacromial space of the right shoulder through lateral approach.  He had immediate excellent response at both the sides.    The patient says that he is has 1 more year to put in before retiring.  I offered to give him a note for the 2 days that he did not work but he said that he just put it on personal leave.  I will see him as necessary.

## 2024-05-17 ENCOUNTER — TELEPHONE (OUTPATIENT)
Dept: ORTHOPEDIC SURGERY | Age: 69
End: 2024-05-17

## 2024-05-17 NOTE — TELEPHONE ENCOUNTER
Received call from patient requesting status update of [facility] paperwork. Pt stated he dropped it off 6 weeks ago and his work still has not received it. Pt stated he gave it to \"the lady on the left up front\".    Pt stated if this is not returned by May 24, 2024 his work is going to penalize him and cut off his benefits.    Pt is going to contact his employer and have them refax the paperwork.    Please advise.    Call back#: [phone]

## 2024-06-03 ENCOUNTER — OFFICE VISIT (OUTPATIENT)
Dept: ORTHOPEDIC SURGERY | Age: 69
End: 2024-06-03
Payer: COMMERCIAL

## 2024-06-03 VITALS — HEIGHT: 75 IN | WEIGHT: 199 LBS | BODY MASS INDEX: 24.74 KG/M2

## 2024-06-03 DIAGNOSIS — M19.032 PRIMARY OSTEOARTHRITIS OF LEFT WRIST: ICD-10-CM

## 2024-06-03 DIAGNOSIS — R52 PAIN: Primary | ICD-10-CM

## 2024-06-03 PROCEDURE — 20605 DRAIN/INJ JOINT/BURSA W/O US: CPT | Performed by: ORTHOPAEDIC SURGERY

## 2024-06-03 PROCEDURE — 99213 OFFICE O/P EST LOW 20 MIN: CPT | Performed by: ORTHOPAEDIC SURGERY

## 2024-06-03 PROCEDURE — 1123F ACP DISCUSS/DSCN MKR DOCD: CPT | Performed by: ORTHOPAEDIC SURGERY

## 2024-06-03 RX ORDER — METHYLPREDNISOLONE ACETATE 40 MG/ML
40 INJECTION, SUSPENSION INTRA-ARTICULAR; INTRALESIONAL; INTRAMUSCULAR; SOFT TISSUE ONCE
Status: SHIPPED | OUTPATIENT
Start: 2024-06-03

## 2024-06-03 RX ORDER — LIDOCAINE HYDROCHLORIDE 10 MG/ML
3 INJECTION, SOLUTION INFILTRATION; PERINEURAL ONCE
Status: SHIPPED | OUTPATIENT
Start: 2024-06-03

## 2024-06-03 NOTE — PROGRESS NOTES
This patient is returning here today complaining of pain in the left wrist.    Patient has had diagnosis of SLACK lesion both the wrist.  At his last visit the right side was not symptomatic    Injected with excellent response.  At that time he had no symptoms in the left wrist but now is having the left his symptoms.    Examination: The left wrist is definitely swollen.  His ulnar styloid is extremely prominent.  He has dorsiflexion plantarflexion of 30 degrees each.  Supination is three quarters pronation is full.  Abduction of 20 degrees and 10 degrees of adduction.  His motions were painful.    X-rays: Further x-rays were taken today which show definite deterioration in the degenerative process and instability in the radiocarpal joint.    Diagnosis: Posttraumatic osteoarthritis left wrist.    Treatment: Under sterile condition I injected 40 mg Depo-Medrol and 3 cc of 1% plain lidocaine through dorsal approach without any complication.  He had immediate excellent response to this.    Patient states that he is going to make an appointment to have his hip injected.    Patient is not interested in any surgery at this stage until after senior living in a years time.

## 2024-06-11 ENCOUNTER — TELEPHONE (OUTPATIENT)
Dept: ORTHOPEDIC SURGERY | Age: 69
End: 2024-06-11

## 2024-06-11 NOTE — TELEPHONE ENCOUNTER
Patient is asking for a return call regarding his [place] paperwork for . He states he dropped it off to the office a few months ago and  has not received anything for this year.   Last date shown scanned was 8/2023.    Please return his call at the earliest convenience to discuss.    Thank you.

## 2024-06-17 ENCOUNTER — OFFICE VISIT (OUTPATIENT)
Dept: ORTHOPEDIC SURGERY | Age: 69
End: 2024-06-17
Payer: COMMERCIAL

## 2024-06-17 VITALS — WEIGHT: 199 LBS | BODY MASS INDEX: 24.74 KG/M2 | HEIGHT: 75 IN

## 2024-06-17 DIAGNOSIS — M43.16 SPONDYLOLISTHESIS, LUMBAR REGION: ICD-10-CM

## 2024-06-17 DIAGNOSIS — M47.816 LUMBAR SPONDYLOSIS: Primary | ICD-10-CM

## 2024-06-17 PROCEDURE — 1123F ACP DISCUSS/DSCN MKR DOCD: CPT | Performed by: ORTHOPAEDIC SURGERY

## 2024-06-17 PROCEDURE — 99213 OFFICE O/P EST LOW 20 MIN: CPT | Performed by: ORTHOPAEDIC SURGERY

## 2024-06-17 NOTE — PROGRESS NOTES
This 69-year-old patient is seen here today complaining of pain over the left greater trochanteric area that he has had for about 2 months.  There is no history of injury.  The pain is constant but aggravated with activities and difficulty sleeping.  There is no radiation of pain and there is no paresthesia associated with this.  He had denies any back pain.    Examination: His gait is normal.  In standing position the spine appears straight but on forward bending there does appear to be slight right rib hump.    He has good full flexion of the spine without any pain.  Extension was only slightly painful.  Lateral flexion to the right was slightly painful over the left greater trochanter but lateral flexion to the left was significantly painful over the left greater trochanter.    In supine position he has excellent range of motion of the hip.  There was no tenderness over the trochanter.  His abduction is excellent.    X-rays: X-rays of the lumbar spine revealed that he has a grade 1 spondylolisthesis at L5-S1.  There are degenerative changes.    Diagnosis: Lumbar spondylosis with grade 1 spondylolisthesis of L5 over S1.    Treatment: I am starting him on physical therapy.  He says Naprosyn does help him and he has few left so he will continue with that.  We will see him again in 4 weeks time.

## 2024-07-01 ENCOUNTER — HOSPITAL ENCOUNTER (OUTPATIENT)
Dept: PHYSICAL THERAPY | Facility: CLINIC | Age: 69
Setting detail: THERAPIES SERIES
Discharge: HOME | End: 2024-07-01
Attending: ORTHOPAEDIC SURGERY
Payer: COMMERCIAL

## 2024-07-01 PROCEDURE — 97161 PT EVAL LOW COMPLEX 20 MIN: CPT

## 2024-07-01 PROCEDURE — 97110 THERAPEUTIC EXERCISES: CPT

## 2024-07-01 NOTE — CONSULTS
Therapy  11581 [] Electrical Stimulation Attended  32300   [x] Instruction in HEP  [x] Lumbar/Cervical Traction  09803   [] Aquatic Therapy   45756 [x] Cold/hotpack    [] Massage   94030      [] Dry Needling, 1 or 2 muscles  20560   [] Biofeedback, first 15 minutes   90912  [] Biofeedback, additional 15 minutes   90913 [] Dry Needling, 3 or more muscles  20561     []  Medication allergies reviewed for use of    Dexamethasone Sodium Phosphate 4mg/ml     with iontophoresis treatments.   Pt is not allergic.    Frequency:  2 x/week for 8 visits    Today’s Treatment:  Modalities:   Precautions: Chronic B knee pain  Exercises:  Exercise Reps/ Time Weight/ Level Comments         LTR 5x5\" ea     PPT 10x5\"     Hooklying hip abduction 10x           Other:    Specific Instructions for next treatment:  Improve B hip mobility  Flexion based core strengthening  Trial self lumbar distraction/manual lumbar distraction by therapist  B hip strengthening  Standing exercises to tolerance      Evaluation Complexity:  History (Personal factors, comorbidities) [x] 0 [] 1-2 [] 3+   Exam (limitations, restrictions) [] 1-2 [] 3 [x] 4+   Clinical presentation (progression) [x] Stable [] Evolving  [] Unstable   Decision Making [x] Low [] Moderate [] High    [x] Low Complexity [] Moderate Complexity [] High Complexity       Treatment Charges: Mins Units   [x] Evaluation       [x]  Low       []  Moderate       []  High 30 1   []  Modalities     [x]  Ther Exercise 10 1   []  Manual Therapy     []  Ther Activities     []  Aquatics     []  Vasocompression     []  Other       TOTAL BILLABLE TIME: 1450    Time in: 1410      Time out: 1500    Electronically signed by: Doni Springer PT        Physician Signature:________________________________Date:__________________  By signing above or cosigning this note, I have reviewed this plan of care and certify a need for medically necessary rehabilitation services.     *PLEASE SIGN ABOVE AND FAX BACK ALL

## 2024-07-15 NOTE — FLOWSHEET NOTE
[] [facility]  Outpatient Rehabilitation &  Therapy  [address]  [phone]  [phone] [x] [facility]  Outpatient Rehabilitation &  Therapy  [address]  P: [phone]  F: [phone] [] [facility]  Outpatient Rehabilitation &  Therapy  [address]  P: [phone]  F: [phone] [] [facility]  Outpatient Rehabilitation &  Therapy  [address]  [phone]  [phone] [] [facility]  Outpatient Rehabilitation &  Therapy  [address]   P: [phone]  F: [phone]  [] [facility]  Outpatient Rehabilitation &  Therapy  [address]  P: [phone]  F: [phone] [] [facility]  Outpatient Rehabilitation &  Therapy  [address]  P: [phone]  F: [phone] [] [facility]  Outpatient Rehabilitation &  Therapy  [address]  P: [phone]  F: [phone] [] [facility]  Outpatient Rehabilitation &  Therapy  [address]  P: [phone]  F: [phone]  [] [facility] Outpatient Rehabilitation &  Therapy  [address]  P: [phone]  F: [phone]     Physical Therapy Daily Treatment Note    Date:  7/15/2024  Patient Name:  Tom Dorado    :  1955  MRN: [id number]  Physician: Tarik Abbott MD                           Insurance: [employer] PPO (60 vs per calendar year PT/OT/ST combined, 60/60 vs remaining)  Medical Diagnosis: M47.816 (ICD-10-CM) - Lumbar [id number] (ICD-10-CM) - Spondylolisthesis, lumbar region                   Rehab Codes: TBD  Onset Date: 24                 Next 's appt.: TBD       Visit# / total visits: ; Progress note for Medicare patient due at visit 8    Cancels/No Shows: 0/0  Frequency:  2 x/week for 8 visits       Subjective:

## 2024-07-16 ENCOUNTER — HOSPITAL ENCOUNTER (OUTPATIENT)
Dept: PHYSICAL THERAPY | Facility: CLINIC | Age: 69
Setting detail: THERAPIES SERIES
Discharge: HOME | End: 2024-07-16
Attending: ORTHOPAEDIC SURGERY
Payer: COMMERCIAL

## 2024-07-16 PROCEDURE — 97110 THERAPEUTIC EXERCISES: CPT

## 2024-07-22 ENCOUNTER — OFFICE VISIT (OUTPATIENT)
Dept: ORTHOPEDIC SURGERY | Age: 69
End: 2024-07-22
Payer: COMMERCIAL

## 2024-07-22 VITALS — WEIGHT: 199 LBS | BODY MASS INDEX: 24.74 KG/M2 | HEIGHT: 75 IN

## 2024-07-22 DIAGNOSIS — M47.816 LUMBAR SPONDYLOSIS: Primary | ICD-10-CM

## 2024-07-22 DIAGNOSIS — M19.132 POST-TRAUMATIC OSTEOARTHRITIS OF LEFT WRIST: ICD-10-CM

## 2024-07-22 PROCEDURE — 99213 OFFICE O/P EST LOW 20 MIN: CPT | Performed by: ORTHOPAEDIC SURGERY

## 2024-07-22 PROCEDURE — 1123F ACP DISCUSS/DSCN MKR DOCD: CPT | Performed by: ORTHOPAEDIC SURGERY

## 2024-07-22 NOTE — PROGRESS NOTES
This patient has been followed up for lumbar spondylosis is seen here in follow-up.  Patient has been going to physical therapy.  He says this has helped him.    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    Examination: Left wrist examination shows the ulnar head is definitely more mobile and is subluxed dorsally.  He has good range of motion in the wrist.    Diagnosis: #1 lumbar spondylosis.  #2 radial scaphoid osteoarthritis left wrist.    Treatment: At the present time he says that his hand is doing quite well but have told him that he will be referred to Dr. Fletcher if it becomes symptomatic.  The wrist became asymptomatic after his job description was changed.  He reminded me that I was supposed to have referred him to Dr. Fletcher but I obviously did not follow through.  However at the present time he does not require any treatment for the wrist.    As for his lumbar spondylosis he will continue and finish off the remaining sessions.  He will be seen as needed.

## 2024-07-24 ENCOUNTER — HOSPITAL ENCOUNTER (OUTPATIENT)
Dept: PHYSICAL THERAPY | Facility: CLINIC | Age: 69
Setting detail: THERAPIES SERIES
Discharge: HOME | End: 2024-07-24
Attending: ORTHOPAEDIC SURGERY
Payer: COMMERCIAL

## 2024-07-24 PROCEDURE — 97110 THERAPEUTIC EXERCISES: CPT

## 2024-07-24 NOTE — FLOWSHEET NOTE
stretching to address c/o tightness in LB and bilateral hips. Pt reports a \"good stretch\" in L hip during. Pt did get a leg cramp in R LE during sidelying clamshells and did have to get up to walk around to alleviate the pain therefore, held resuming sidelying exercises this date. Cueing provided to ensure upright posture and core engagement during standing exercises with good understanding. Does have knee discomfort during standing exercises however, is tolerable. Will continue to progress strengthening program per pt tolerance.     [] No change.     [] Other:  [x] Patient would continue to benefit from skilled physical therapy services in order to: Reduce pain, address ROM, and strength deficits detailed above for return to previous level of function with daily and recreational activities     Problems:    [x] ? Pain: 2-10/10 low back and L hip  [x] ? ROM: Pain with lumbar extension and L side bend,  [x] ? Strength: Core, B hip abductors and extensors  [x] ? Function:  [x] Other: Difficulty with first walking, walking and standing for long periods of time, bend/lift/twist, transitioning supine to sit, sit to stand       Goals  MET NOT MET ON-  GOING  Details   Date Addressed:            LTG: To be met in 4 treatments            1. ? Pain: Decrease pain levels to 5/10 at worst to ease ADL progression. []  []  []      2. ? ROM: Increase to full pain free trunk AROM for ease of bending and lifting tasks []  []  []      3. ? Strength: Increase bilateral hip strength to 4+/5 throughout to ease functional limitations and mobility  []  []  []      4. Independent with Home Exercise Programs with ability to demonstrate exercises without cueing for technique.  [x]  []  []      5. Improve score on RADHA by 10% impairment less than baseline impairment to demonstrate improved functional mobility []  []  []      6. Pt will demonstrate level 3 on the [facility] core stability test for ease of lifting tasks such as doing the laundry

## 2024-08-08 ENCOUNTER — HOSPITAL ENCOUNTER (OUTPATIENT)
Dept: PHYSICAL THERAPY | Facility: CLINIC | Age: 69
Setting detail: THERAPIES SERIES
Discharge: HOME | End: 2024-08-08
Attending: ORTHOPAEDIC SURGERY
Payer: COMMERCIAL

## 2024-08-08 PROCEDURE — 97110 THERAPEUTIC EXERCISES: CPT

## 2024-08-08 NOTE — FLOWSHEET NOTE
Demonstrates/verbalizes HEP/Ed previously given.     Access Code: [email or web address]  URL: [email or web address]  Date: 07/01/2024  Prepared by: Doni Springer  Exercises  - Supine Lower Trunk Rotation  - 1 x daily - 7 x weekly - 1 sets - 10 reps - 5 second hold  - Supine Posterior Pelvic Tilt  - 1 x daily - 7 x weekly - 2-3 sets - 10 reps - 5 second hold  - Hooklying Clamshell with Resistance  - 1 x daily - 3 x weekly - 2-3 sets - 10 reps  Date: 07/16/2024  Prepared by: Lucretia Bro  - Hooklying Lumbar Traction  - 1 x daily - 7 x weekly - 5 sets - 10sec hold  - Supine Hamstring Stretch with Strap  - 1 x daily - 7 x weekly - 3 sets - 30sec hold  - Clamshell with Resistance  - 1 x daily - 3 x weekly - 1 sets - 15 reps  - Sidelying Hip Abduction with Resistance at Thighs  - 1 x daily - 3 x weekly - 1 sets - 10 reps  - Heel Raises with Counter Support  - 1 x daily - 3 x weekly - 1 sets - 15 reps  - Standing March with Counter Support  - 1 x daily - 3 x weekly - 1 sets - 15 reps  - Standing Hip Abduction with Counter Support  - 1 x daily - 3 x weekly - 1 sets - 10 reps  - Standing Hip Extension with Counter Support  - 1 x daily - 3 x weekly - 1 sets - 10 reps      Plan: [x] Continue current frequency toward long and short term goals.    [] Specific Instructions for subsequent treatments:       Time In: 5:00 pm            Time Out: 5:53 pm     Electronically signed by:  Chantel Wang PT

## 2024-08-20 ENCOUNTER — HOSPITAL ENCOUNTER (OUTPATIENT)
Dept: PHYSICAL THERAPY | Facility: CLINIC | Age: 69
Setting detail: THERAPIES SERIES
Discharge: HOME | End: 2024-08-20
Attending: ORTHOPAEDIC SURGERY
Payer: COMMERCIAL

## 2024-08-20 PROCEDURE — 97110 THERAPEUTIC EXERCISES: CPT

## 2024-08-20 NOTE — FLOWSHEET NOTE
[] [facility]  Outpatient Rehabilitation &  Therapy  [address]  [phone]  [phone] [x] [facility]  Outpatient Rehabilitation &  Therapy  [address]  P: [phone]  F: [phone] [] [facility]  Outpatient Rehabilitation &  Therapy  [address]  P: [phone]  F: [phone] [] [facility]  Outpatient Rehabilitation &  Therapy  [address]  [phone]  [phone] [] [facility]  Outpatient Rehabilitation &  Therapy  [address]   P: [phone]  F: [phone]  [] [facility]  Outpatient Rehabilitation &  Therapy  [address]  P: [phone]  F: [phone] [] [facility]  Outpatient Rehabilitation &  Therapy  [address]  P: [phone]  F: [phone] [] [facility]  Outpatient Rehabilitation &  Therapy  [address]  P: [phone]  F: [phone] [] [facility]  Outpatient Rehabilitation &  Therapy  [address]  P: [phone]  F: [phone]  [] [facility] Outpatient Rehabilitation &  Therapy  [address]  P: [phone]  F: [phone]     Physical Therapy Daily Treatment Note    Date:  2024  Patient Name:  Tom Dorado    :  1955  MRN: [id number]  Physician: Tarik Abbott MD                           Insurance: [employer] PPO (60 vs per calendar year PT/OT/ST combined, 60/60 vs remaining)  Medical Diagnosis: M47.816 (ICD-10-CM) - Lumbar [id number] (ICD-10-CM) - Spondylolisthesis, lumbar region                   Rehab Codes: TBD  Onset Date: 24                 Next 's appt.: TBD       Visit# / total visits: ; Progress note for Medicare patient due at visit 8 (Scheduling 1 visit at time because of poor attendance)   Cancels/No Shows:

## 2024-09-04 ENCOUNTER — HOSPITAL ENCOUNTER (OUTPATIENT)
Dept: PHYSICAL THERAPY | Facility: CLINIC | Age: 69
Setting detail: THERAPIES SERIES
Discharge: HOME | End: 2024-09-04
Attending: ORTHOPAEDIC SURGERY
Payer: COMMERCIAL

## 2024-09-04 PROCEDURE — 97110 THERAPEUTIC EXERCISES: CPT

## 2024-09-04 NOTE — FLOWSHEET NOTE
Modalities     [x]  Ther Exercise 40 3   []  Manual Therapy     []  Ther Activities     []  Neuro Re-ed     []  Vasocompression     [] Gait     []  Other     Total Billable time 40 3       Assessment: [] Progressing toward goals.     [] No change.     [x] Other: Patient continues to have moderate tolerance to treatment; occasionally has left hip pain along with lower extremity cramping during sidelying interventions.   [x] Patient would continue to benefit from skilled physical therapy services in order to: Reduce pain, address ROM, and strength deficits detailed above for return to previous level of function with daily and recreational activities     Problems:    [x] ? Pain: 2-10/10 low back and L hip  [x] ? ROM: Pain with lumbar extension and L side bend,  [x] ? Strength: Core, B hip abductors and extensors  [x] ? Function:  [x] Other: Difficulty with first walking, walking and standing for long periods of time, bend/lift/twist, transitioning supine to sit, sit to stand       Goals  MET NOT MET ON-  GOING  Details   Date Addressed:            LTG: To be met in 8 treatments            1. ? Pain: Decrease pain levels to 5/10 at worst to ease ADL progression. []  []  []      2. ? ROM: Increase to full pain free trunk AROM for ease of bending and lifting tasks []  []  []      3. ? Strength: Increase bilateral hip strength to 4+/5 throughout to ease functional limitations and mobility  []  []  []      4. Independent with Home Exercise Programs with ability to demonstrate exercises without cueing for technique.  [x]  []  []      5. Improve score on RADHA by 10% impairment less than baseline impairment to demonstrate improved functional mobility []  []  []      6. Pt will demonstrate level 3 on the [employer] core stability test for ease of lifting tasks such as doing the laundry []  []  []         Patient goals: Less pain and improved function    Pt. Education:  [x] Yes  [x] No  [x] Reviewed Prior HEP/Ed  Method of

## 2024-09-12 DIAGNOSIS — M25.562 PAIN IN BOTH KNEES, UNSPECIFIED CHRONICITY: Primary | ICD-10-CM

## 2024-09-12 DIAGNOSIS — M25.561 PAIN IN BOTH KNEES, UNSPECIFIED CHRONICITY: Primary | ICD-10-CM

## 2024-09-13 ENCOUNTER — OFFICE VISIT (OUTPATIENT)
Dept: ORTHOPEDIC SURGERY | Age: 69
End: 2024-09-13
Payer: COMMERCIAL

## 2024-09-13 VITALS — WEIGHT: 197 LBS | BODY MASS INDEX: 24.49 KG/M2 | HEIGHT: 75 IN

## 2024-09-13 DIAGNOSIS — M25.561 CHRONIC PAIN OF BOTH KNEES: ICD-10-CM

## 2024-09-13 DIAGNOSIS — M17.0 ARTHRITIS OF BOTH KNEES: Primary | ICD-10-CM

## 2024-09-13 DIAGNOSIS — G89.29 CHRONIC PAIN OF BOTH KNEES: ICD-10-CM

## 2024-09-13 DIAGNOSIS — M25.562 CHRONIC PAIN OF BOTH KNEES: ICD-10-CM

## 2024-09-13 PROCEDURE — 1123F ACP DISCUSS/DSCN MKR DOCD: CPT

## 2024-09-13 PROCEDURE — 99213 OFFICE O/P EST LOW 20 MIN: CPT

## 2024-09-13 ASSESSMENT — ENCOUNTER SYMPTOMS
COLOR CHANGE: 0
BACK PAIN: 1

## 2024-09-23 ENCOUNTER — HOSPITAL ENCOUNTER (OUTPATIENT)
Dept: PHYSICAL THERAPY | Facility: CLINIC | Age: 69
Setting detail: THERAPIES SERIES
Discharge: HOME | End: 2024-09-23
Attending: ORTHOPAEDIC SURGERY
Payer: COMMERCIAL

## 2024-09-23 PROCEDURE — 97110 THERAPEUTIC EXERCISES: CPT
